# Patient Record
Sex: FEMALE | Race: WHITE
[De-identification: names, ages, dates, MRNs, and addresses within clinical notes are randomized per-mention and may not be internally consistent; named-entity substitution may affect disease eponyms.]

---

## 2019-12-20 NOTE — EDM.PDOC
ED HPI GENERAL MEDICAL PROBLEM





- General


Chief Complaint: Respiratory Problem


Stated Complaint: COUGH LOSS OF VOICE


Time Seen by Provider: 12/20/19 00:51


Source of Information: Reports: Patient, Family ()


History Limitations: Reports: No Limitations





- History of Present Illness


INITIAL COMMENTS - FREE TEXT/NARRATIVE: 





Mrs. Rodriguez is a very pleasant 29-year-old woman with a past medical history 

significant for asthma, irritable bowel syndrome, endometriosis, prediabetes, 

and untreated depression, PTSD, and obstructive sleep apnea, who states that 

she has been sick on and off for the past month. She reports a cough 

occasionally productive of green sputum, and slight wheezing. She reports 

laryngitis, and, indeed, she has a hoarse, thin voice at this time. She states 

that her back and stomach have been hurting. These are no worse if she is 

supine. She states that she has had some nausea, but no vomiting. She denies 

having a sore throat. No recent fever, constipation, diarrhea, or urinary 

symptoms.





The patient has been taking Mucinex, Sudafed, and Delsym, all without any 

relief of symptoms.





The patient states that she saw Dr. Khushboo Velásquez on 11/15/2019, but that 

no tests were done. She was diagnosed with bronchitis, and prescribed 

prednisone.





She states that she then saw her PCP on 12/10/2019. She states that again no 

tests were done, but that she was prescribed a Z-Ramo and Flovent. She states 

that the Flovent was to treat her asthma, but the patient does not know why she 

was prescribed a Z-Ramo.





She states that she then saw Marlene Trevino NP, on 12/14/2019. She states that 

again no tests were done, but that she was prescribed prednisone and amoxicillin

, again to treat asthma and something else.








The patient's PCP is Angy Szymanski NP.


The patient did not receive an influenza vaccine this season, but agreed to 

receive one here.











- Related Data


 Allergies











Allergy/AdvReac Type Severity Reaction Status Date / Time


 


grass pollen Allergy  Cannot Verified 12/20/19 00:05





   Remember  


 


cow milk Allergy  Hives Uncoded 11/21/19 07:58


 


essential oils Allergy  Anaphylactic Uncoded 11/21/19 07:58





   Shock  


 


tylenol with codeine Allergy  Vomiting Uncoded 11/21/19 07:58











Home Meds: 


 Home Meds





Albuterol [Proventil Neb Soln] 2.5 mg NEB ASDIRECTED 04/21/19 [History]


Epi-Pen. 1 unit IM ONETIME PRN 04/21/19 [History]


Montelukast [Singulair] 10 mg PO DAILY 11/21/19 [History]


Fluticasone Propionate [Flovent Hfa] 1 puff INH BID 12/20/19 [History]











Past Medical History


HEENT History: Reports: Impaired Vision


Respiratory History: Reports: Asthma (PFT-confirmed), Sleep Apnea (untreated)


Gastrointestinal History: Reports: GERD, Irritable Bowel Syndrome


OB/GYN History: Reports: Endometriosis (exploratory laparoscopy-confirmed)


Psychiatric History: Reports: Depression (untreated), PTSD (untreated)


Endocrine/Metabolic History: Reports: Obesity/BMI 30+, Other (See Below) (

Prediabetes)





- Past Surgical History


HEENT Surgical History: Reports: Adenoidectomy, Oral Surgery (wisdom teeth 

extraction), Tonsillectomy


GI Surgical History: Reports: Colonoscopy (x 3), EGD (x 2)


Female  Surgical History: Reports: Other (See Below) (Exploratory laparoscopy 

for endometriosis x 2)


Musculoskeletal Surgical History: Reports: Other (See Below) (Right patellar 

realignment)





Social & Family History





- Family History


Family Medical History: Noncontributory





- Tobacco Use


Smoking Status *Q: Former Smoker


Years of Tobacco use: 11


Packs/Tins Daily: 0.1


Month/Year Tobacco Last Used: Quit June 2019





- Caffeine Use


Caffeine Use: Reports: Coffee, Energy Drinks, Soda, Tea





- Alcohol Use


Alcohol Use History: Yes


Alcohol Use Frequency: Socially





- Recreational Drug Use


Recreational Drug Use: No





- Living Situation & Occupation


Living situation: Reports: , with Spouse


Occupation: Unemployed





ED ROS GENERAL





- Review of Systems


Review Of Systems: Comprehensive ROS is negative, except as noted in HPI.





ED EXAM, GENERAL





- Physical Exam


Exam: See Below


Exam Limited By: No Limitations


General Appearance: Alert, WD/WN, No Apparent Distress


Eye Exam: Bilateral Eye: EOMI, Normal Inspection


Ears: Normal External Exam, Normal Canal, Hearing Grossly Normal, Normal TMs


Nose: Normal Inspection, No Blood, Other (Bilateral nasal mucosa edema)


Throat/Mouth: Normal Inspection, Normal Lips, Normal Teeth, Normal Gums, Normal 

Oropharynx, No Airway Compromise, Other (Hoarse voice)


Head: Atraumatic, Normocephalic


Neck: Normal Inspection, Supple, Non-Tender, Full Range of Motion.  No: 

Lymphadenopathy (L), Lymphadenopathy (R)


Respiratory/Chest: No Respiratory Distress, No Accessory Muscle Use, Chest Non-

Tender, Crackles (slight, right lungfield only).  No: Decreased Breath Sounds, 

Rhonchi, Wheezing, Stridor, Prolonged Expiration


Cardiovascular: Normal Peripheral Pulses, Regular Rate, Rhythm, No Edema, No 

Gallop, No JVD, No Murmur, No Rub


Peripheral Pulses: 4+: Radial (L), Radial (R)


GI/Abdominal: Normal Bowel Sounds, Soft, Non-Tender, No Organomegaly, No 

Distention, No Abnormal Bruit, No Mass


 (Female) Exam: Deferred


Rectal (Female) Exam: Deferred


Back Exam: Normal Inspection, Full Range of Motion, NT


Extremities: Normal Inspection, Normal Range of Motion, No Pedal Edema, Normal 

Capillary Refill


Neurological: Alert, Oriented, Normal Cognition, No Motor/Sensory Deficits


Psychiatric: Normal Affect


Skin Exam: Warm, Dry, Intact, Normal Color, No Rash





Course





- Vital Signs


Last Recorded V/S: 


 Last Vital Signs











Temp  36.2 C   12/20/19 00:01


 


Pulse  77   12/20/19 00:01


 


Resp  20   12/20/19 00:01


 


BP  115/75   12/20/19 00:01


 


Pulse Ox  97   12/20/19 00:01














- Orders/Labs/Meds


Meds: 


Medications














Discontinued Medications














Generic Name Dose Route Start Last Admin





  Trade Name Freq  PRN Reason Stop Dose Admin


 


Influenza Virus Vaccine  60 mcg  12/20/19 02:30  12/20/19 03:16





  Fluzone Quad [id number] Syringe  IM  12/20/19 02:31  60 mcg





  .ONCE ONE   Administration





     





     





     





     














- Re-Assessments/Exams


Free Text/Narrative Re-Assessment/Exam: 





12/20/19 02:05


Because I thought I heard some crackles on the right lung field, I ordered a 

chest x-ray, but because the patient has not had a fever, and her oxygen 

saturation is normal, I did not order any blood work.





2-view chest radiograph appears to be grossly normal. The cardiac silhouette is 

within normal limits. No pulmonary vascular congestion. No pleural effusions. 

No focal infiltrate. No pneumothorax. Formal read per the Radiologist pending.





Chest x-ray results discussed with the patient and her . The patient 

appears to have a viral URI with postnasal drip causing her to have a cough. 

She is not suffering from an asthma exacerbation, and she does not have 

bronchitis or pneumonia. I recommended that she stop taking the over-the-

counter cough and cold remedies that she has been taking, all of which are of 

no use. I had the respiratory therapist provide the patient a peak flow meter 

and a space chamber, with instructions to the patient on how to use them.





The patient will receive an influenza vaccine prior to discharge.





Departure





- Departure


Time of Disposition: 02:13


Disposition: Home, Self-Care 01


Condition: Good


Clinical Impression: 


 Viral URI with cough








- Discharge Information


*PRESCRIPTION DRUG MONITORING PROGRAM REVIEWED*: Not Applicable


*COPY OF PRESCRIPTION DRUG MONITORING REPORT IN PATIENT [facility]: Not Applicable


Instructions:  Upper Respiratory Infection, Adult, Easy-to-Read


Referrals: 


Angy Szymanski MD [Primary Care Provider] - 


Forms:  ED Department Discharge


Additional Instructions: 


You were seen in the emergency room for 1 month of a cough with laryngitis.





Workup in the ER included a chest x-ray, which returned normal. You do not have 

pneumonia or bronchitis.





Based on your history, physical exam, and chest x-ray results, your cough and 

laryngitis are most likely due to a viral URI, also known as a common cold. You 

are not suffering from an asthma exacerbation.





As discussed, we recommend that you discontinue all of the over-the-counter 

cough and cold medicines, as well as the Flovent.





You have been given a peak flow meter and a space chamber.





If you are suffering from shortness of breath and wheezing, with or without a 

cough, check your peak flow. If it is in the green zone, do not use albuterol, 

however, if it is in the yellow or red zone, use albuterol as much as necessary 

to get relief. If you require albuterol more often than every 4 hours, you need 

to be seen by a doctor.





Make sure that you use your space chamber whenever you use your albuterol MDI, 

and be sure to shake your MDI for at least 10 seconds prior to activating it.





If any other problems, please do not hesitate to return to the ER.








*You received an influenza vaccine during your ER visit.*





Sepsis Event Note





- Evaluation


Sepsis Screening Result: No Definite Risk





- Focused Exam


Date Exam was Performed: 12/22/19


Time Exam was Performed: 23:21

## 2019-12-20 NOTE — CR
Chest:  Two views of the chest were obtained.

 

Comparison: Prior chest x-ray of 04/21/19.

 

Heart size and mediastinum are normal.  Lungs are clear with no acute 

parenchymal change.  Bony structures are unremarkable.

 

Impression:

1.  Nothing acute is seen on two-view chest x-ray.

 

Diagnostic code #1

 

This report was dictated in [place] Standard Time

## 2020-01-03 NOTE — EDM.PDOC
ED HPI GENERAL MEDICAL PROBLEM





- General


Chief Complaint: Allergic Reaction


Stated Complaint: POSS ALLERGIC REACTION


Time Seen by Provider: 01/03/20 09:37


Source of Information: Reports: Patient


History Limitations: Reports: No Limitations





- History of Present Illness


INITIAL COMMENTS - FREE TEXT/NARRATIVE: 


29-year-old female presents to the ED with generalized pruritus and throat 

itching and feeling like it's closing with some difficulty breathing as well 

since inhaling aromatic essential oils last night. She developed a bit of a red 

rash on her upper anterior chest but otherwise feels generally pruritic. She 

has no asthmatic and did use her inhaler once last night. Start Benadryl 50 mg 

before bed last night and 25 mg at 0600 hrs. this morning without much relief. 

The nation reveals good air entry to both lung fields without any wheezing. She 

is in no distress.





Onset: Sudden


Onset Date: 01/02/20


Onset Time: 21:00


Duration: Hour(s): (Used essential oils last night.), Constant, Other (Not 

getting better.)


Location: Reports: Neck, Chest ( Eyes pruritus him difficulty breathing and 

wheezing. ), Generalized (Does like throat is closing.)


Quality: Reports: Other


Severity: Moderate (Mostly generalized itching with a sense of itching in her 

throat and throat closure.)


Improves with: Reports: None


Worsens with: Reports: None


Context: Reports: Other (Inhalation of essential oils last night. She did not 

apply any to her skin.).  Denies: Activity, Exercise, Lifting, Sick Contact, 

Trauma


Associated Symptoms: Reports: Malaise, Other.  Denies: Confusion, Chest Pain, 

Cough, cough w sputum, Diaphoresis, Fever/Chills, Headaches, Loss of Appetite, 

Nausea/Vomiting, Rash, Seizure, Shortness of Breath, Syncope


Treatments PTA: Reports: Other (see below) (Generalized pruritus with a sense 

of throat closure. 2 mg last night about 2200 hrs. and 25 mg at 0600 hrs. this 

morning.)


  ** Throat


Pain Score (Numeric/FACES): 6





- Related Data


 Allergies











Allergy/AdvReac Type Severity Reaction Status Date / Time


 


grass pollen Allergy  Cannot Verified 01/03/20 09:05





   Remember  


 


cow milk Allergy  Hives Uncoded 01/03/20 09:05


 


essential oils Allergy  Anaphylactic Uncoded 01/03/20 09:05





   Shock  


 


tylenol with codeine Allergy  Vomiting Uncoded 01/03/20 09:05











Home Meds: 


 Home Meds





Albuterol [Proventil Neb Soln] 2.5 mg NEB ASDIRECTED 04/21/19 [History]


Epi-Pen. 1 unit IM ONETIME PRN 04/21/19 [History]


Montelukast [Singulair] 10 mg PO DAILY 11/21/19 [History]


predniSONE [Prednisone] 20 mg PO BID #6 tablet 01/03/20 [Rx]











Past Medical History


HEENT History: Reports: Impaired Vision


Cardiovascular History: Reports: Other (See Below)


Other Cardiovascular History: R AV heart block


Respiratory History: Reports: Asthma, Sleep Apnea


Gastrointestinal History: Reports: GERD, Irritable Bowel Syndrome


OB/GYN History: Reports: Endometriosis


Neurological History: Reports: Headaches, Chronic, Migraines


Psychiatric History: Reports: ADHD, Anxiety, Depression, PTSD


Endocrine/Metabolic History: Reports: Hypothyroidism, Obesity/BMI 30+, Other (

See Below)


Other Endocrine/Metabolic History: insulin resistent





- Infectious Disease History


Infectious Disease History: Reports: Chicken Pox





- Past Surgical History


HEENT Surgical History: Reports: Adenoidectomy, Oral Surgery, Tonsillectomy


GI Surgical History: Reports: Colonoscopy, EGD


Female  Surgical History: Reports: Other (See Below)


Musculoskeletal Surgical History: Reports: Other (See Below)


Other Musculoskeletal Surgeries/Procedures:: R knee sx





Social & Family History





- Family History


Family Medical History: Noncontributory





- Tobacco Use


Smoking Status *Q: Former Smoker


Used Tobacco, but Quit: Yes


Month/Year Tobacco Last Used: 06/2019





- Caffeine Use


Caffeine Use: Reports: Coffee, Energy Drinks, Soda, Tea





- Recreational Drug Use


Recreational Drug Use: No





- Living Situation & Occupation


Living situation: Reports: , with Spouse


Occupation: Unemployed





ED ROS ALLERGIC REACTION





- Review of Systems


Review Of Systems: See Below


Constitutional: Reports: Malaise, Weakness, Fatigue, Decreased Appetite.  Denies

: Fever, Chills, Weight Loss (Lesion taking Benadryl)


HEENT: Reports: Throat Swelling (Does like her throat is slightly swollen. 

Moist is a little hoarse this morning as well.)


Respiratory: Reports: Shortness of Breath, Wheezing, Cough.  Denies: Pleuritic 

Chest Pain (Improved after taking albuterol neb at home.), Sputum, Hemoptysis


Cardiovascular: Reports: No Symptoms


Endocrine: Reports: Fatigue


GI/Abdominal: Reports: No Symptoms


: Reports: No Symptoms


Musculoskeletal: Reports: No Symptoms


Skin: Reports: Other (Little patch of red rash upper anterior chest just at the 

sternal clavicular joint midline)


Neurological: Reports: Dizziness


Psychiatric: Reports: No Symptoms (Generalized pruritus)


Hematologic/Lymphatic: Reports: No Symptoms


Immunologic: Reports: No Symptoms





ED EXAM GENERAL NO PERIP PULSE





- Physical Exam


Exam: See Below


Exam Limited By: No Limitations


General Appearance: Alert, WD/WN, No Apparent Distress, Other (Vital signs 

temperature 36.3 with pulse of 61 and sinus respiratory disease 18 /83 O2 

sats 94% on room air.)


Eye Exam: Bilateral Eye: Normal Inspection (No inflammation of the conjunctiva)


Ears: Normal TMs


Nose: Normal Inspection


Throat/Mouth: Normal Inspection, Normal Lips, Normal Teeth, Normal Oropharynx, 

Other


Head: Atraumatic, Normocephalic (Uvula is slightly erythematous I believe from 

coughing.)


Neck: Normal Inspection, Supple, Non-Tender, Full Range of Motion.  No: 

Lymphadenopathy (L), Lymphadenopathy (R)


Respiratory/Chest: No Respiratory Distress, Lungs Clear, Normal Breath Sounds, 

No Accessory Muscle Use, Chest Non-Tender.  No: Wheezing, Stridor


Cardiovascular: Normal Peripheral Pulses, Regular Rate, Rhythm, No Edema, No 

Gallop, No Murmur, No Rub


GI/Abdominal: Normal Bowel Sounds, Soft, Non-Tender, No Organomegaly


Extremities: Normal Inspection, Normal Range of Motion, Non-Tender, No Pedal 

Edema


Neurological: Alert, Oriented, CN II-XII Intact, Normal Cognition, Normal Gait


Psychiatric: Normal Affect, Normal Mood


Skin Exam: Warm, Dry, Intact, Normal Color, No Rash (Light erythematous rash 

upper anterior chest.), Other (No hives)





Course





- Vital Signs


Last Recorded V/S: 


 Last Vital Signs











Temp  36.3 C   01/03/20 09:01


 


Pulse  61   01/03/20 09:01


 


Resp  18   01/03/20 09:01


 


BP  109/83   01/03/20 09:01


 


Pulse Ox  94 L  01/03/20 09:01














- Orders/Labs/Meds


Meds: 


Medications














Discontinued Medications














Generic Name Dose Route Start Last Admin





  Trade Name Freq  PRN Reason Stop Dose Admin


 


Hydroxyzine HCl  25 mg  01/03/20 09:48  01/03/20 10:07





  Atarax  PO  01/03/20 09:49  25 mg





  ONETIME ONE   Administration





     





     





     





     


 


Prednisone  30 mg  01/03/20 09:48  01/03/20 10:07





  Prednisone  PO  01/03/20 09:49  30 mg





  ONETIME ONE   Administration





     





     





     





     














- Radiology Interpretation


Free Text/Narrative:: 


29-year-old female presents to the ED with development of mild allergic 

reaction to inhaling and safety central oils last night. Suggest this at about 

2100 hrs. and by 2200 hrs. appreciated that she was struggling a bit with 

throat swelling itching and generalized pruritus development and some shortness 

of breath. She did use Benadryl 50 mg last night about 2200 hrs. and you did 

use her albuterol inhaler. She was still somewhat symptomatic this morning and 

took Benadryl 25 mg at 0600 hrs. Examination reveals some mild erythematous 

rash anterior upper chest but no urticaria. She has suffered from generalized 

pruritus. There is no stridor. For the mouth and uvula are normal. Plan 

prednisone 30 mg by mouth now and Atarax 25 mg by mouth in the ED. She'll be 

discharged on prednisone 20 mg twice daily for the next 3 days with the next 

dose due at [facility]. Zyrtec 10 mg twice daily until no further 

itching. Note given to excuse her from the workplace today.








Departure





- Departure


Time of Disposition: 09:58


Disposition: Home, Self-Care 01


Condition: Fair


Clinical Impression: 


Allergic reaction


Qualifiers:


 Encounter type: initial encounter Qualified Code(s): T78.40XA - Allergy, 

unspecified, initial encounter








- Discharge Information


*PRESCRIPTION DRUG MONITORING PROGRAM REVIEWED*: Not Applicable


*COPY OF PRESCRIPTION DRUG MONITORING REPORT IN PATIENT [facility]: Not Applicable


Prescriptions: 


predniSONE [Prednisone] 20 mg PO BID #6 tablet


Referrals: 


Angy Szymanski MD [Primary Care Provider] - 


Forms:  ED Department Discharge, ED Return to Work/School Form


Additional Instructions: 


Evaluation the emergency room department in regards to development of allergy 

symptoms particularly to the upper airway and lungs after a using for inhaling 

aromatic oils. He is appear to be a significant irritant to your asthma and you 

developed upper airway inflammation from them as well. Also generalized 

itching. You have taken Benadryl last night and again this morning. You are 

given Atarax 25 mg in the ED but I would prefer you to  some Zyrtec and 

take 10 mg twice daily for the next 3 days to help with the itching. Initial 

dose of steroid prednisone was given in the ED 30 mg. You need to take 20 mg 

twice daily with breakfast and supper for the next 3 days with the next tablet 

being due  at supper tonight. SPECT marked improvement in symptoms over the 

next 4 hours. Note given to excuse from the workplace today.





Sepsis Event Note





- Evaluation


Sepsis Screening Result: No Definite Risk





- Focused Exam


Vital Signs: 


 Vital Signs











  Temp Pulse Resp BP Pulse Ox


 


 01/03/20 09:01  36.3 C  61  18  109/83  94 L











Date Exam was Performed: 01/03/20


Time Exam was Performed: 11:16

## 2020-06-06 NOTE — EDM.PDOC
ED HPI GENERAL MEDICAL PROBLEM





- General


Chief Complaint: Allergic Reaction


Stated Complaint: POSS ALLERGIC REACTION


Time Seen by Provider: 06/06/20 17:09


Source of Information: Reports: Patient


History Limitations: Reports: No Limitations





- History of Present Illness


INITIAL COMMENTS - FREE TEXT/NARRATIVE: 





The patient presents with an allergic reaction.  She used some essential oils 

yesterday.  She has a rash to her arm and back.  She also has some throat 

swelling.  She has no chest pain, abdominal pain, shortness of breath, nausea 

or vomiting.  She did have an anaphylactic reaction with essential oils in the 

past.  This was a shampoo and she did not realize essential oils were in it.


Onset: Gradual


Duration: Hour(s):


Location: Reports: Back, Upper Extremity, Right


Severity: Moderate


Improves with: Reports: None


Worsens with: Reports: None


Associated Symptoms: Reports: No Other Symptoms





- Related Data


 Allergies











Allergy/AdvReac Type Severity Reaction Status Date / Time


 


grass pollen Allergy  Cannot Verified 06/06/20 17:11





   Remember  


 


cow milk Allergy  Hives Uncoded 01/03/20 09:05


 


essential oils Allergy  Anaphylactic Uncoded 01/03/20 09:05





   Shock  


 


tylenol with codeine Allergy  Vomiting Uncoded 01/03/20 09:05











Home Meds: 


 Home Meds





Albuterol [Proventil Neb Soln] 2.5 mg NEB ASDIRECTED 04/21/19 [History]


Epi-Pen. 1 unit IM ONETIME PRN 04/21/19 [History]


Montelukast [Singulair] 10 mg PO DAILY 11/21/19 [History]


predniSONE [Prednisone] 40 mg PO DAILY #10 tablet 06/06/20 [Rx]











Past Medical History


HEENT History: Reports: Impaired Vision


Cardiovascular History: Reports: Other (See Below)


Other Cardiovascular History: R AV heart block


Respiratory History: Reports: Asthma, Sleep Apnea


Gastrointestinal History: Reports: GERD, Irritable Bowel Syndrome


OB/GYN History: Reports: Endometriosis


Neurological History: Reports: Headaches, Chronic, Migraines


Psychiatric History: Reports: ADHD, Anxiety, Depression, PTSD


Endocrine/Metabolic History: Reports: Hypothyroidism, Obesity/BMI 30+, Other (

See Below)


Other Endocrine/Metabolic History: insulin resistent





- Infectious Disease History


Infectious Disease History: Reports: Chicken Pox





- Past Surgical History


HEENT Surgical History: Reports: Adenoidectomy, Oral Surgery, Tonsillectomy


GI Surgical History: Reports: Colonoscopy, EGD


Female  Surgical History: Reports: Other (See Below)


Musculoskeletal Surgical History: Reports: Other (See Below)


Other Musculoskeletal Surgeries/Procedures:: R knee sx





Social & Family History





- Family History


Family Medical History: Noncontributory





- Tobacco Use


Smoking Status *Q: Never Smoker





- Caffeine Use


Caffeine Use: Reports: Coffee, Energy Drinks, Soda, Tea





- Recreational Drug Use


Recreational Drug Use: No





- Living Situation & Occupation


Living situation: Reports: , with Spouse


Occupation: Unemployed





ED ROS ALLERGIC REACTION





- Review of Systems


Review Of Systems: See Below


Constitutional: Reports: No Symptoms


HEENT: Reports: Other (Throat swelling)


Respiratory: Reports: No Symptoms


Cardiovascular: Reports: No Symptoms


Endocrine: Reports: No Symptoms


GI/Abdominal: Reports: No Symptoms


: Reports: No Symptoms


Musculoskeletal: Reports: No Symptoms


Skin: Reports: Rash





ED EXAM GENERAL NO PERIP PULSE





- Physical Exam


Exam: See Below


Exam Limited By: No Limitations


General Appearance: Alert, No Apparent Distress


Ears: Normal External Exam


Nose: Normal Inspection


Throat/Mouth: Normal Inspection


Head: Atraumatic, Normocephalic


Neck: Normal Inspection


Respiratory/Chest: No Respiratory Distress, Lungs Clear, Normal Breath Sounds


Cardiovascular: Regular Rate, Rhythm, No Edema, No Murmur


GI/Abdominal: Soft, Non-Tender, No Organomegaly, No Mass


Back Exam: Normal Inspection


Extremities: Normal Inspection


Skin Exam: Other (The rash has improved.  I only see a few small bumps)





Course





- Vital Signs


Last Recorded V/S: 


 Last Vital Signs











Temp  98.2 F   06/06/20 17:08


 


Pulse  84   06/06/20 17:08


 


Resp  16   06/06/20 17:08


 


BP  118/74   06/06/20 17:08


 


Pulse Ox  98   06/06/20 17:08














- Orders/Labs/Meds


Meds: 


Medications














Discontinued Medications














Generic Name Dose Route Start Last Admin





  Trade Name Freq  PRN Reason Stop Dose Admin


 


Famotidine  20 mg  06/06/20 17:15  06/06/20 17:22





  Pepcid  PO  06/06/20 17:16  20 mg





  ONETIME ONE   Administration





     





     





     





     


 


Prednisone  40 mg  06/06/20 17:15  06/06/20 17:22





  Prednisone  PO  06/06/20 17:16  40 mg





  ONETIME ONE   Administration





     





     





     





     














- Re-Assessments/Exams


Free Text/Narrative Re-Assessment/Exam: 





06/06/20 17:30


I ordered prednisone 40mg PO and pepcid 20mg PO.


06/06/20 18:14


She is not feeling worse.  I feel it is safe to send her home.  I will get her 

on prednisone and she should take pepcid and benadryl.





Departure





- Departure


Time of Disposition: 18:15


Disposition: Home, Self-Care 01


Condition: Good


Clinical Impression: 


Allergic reaction


Qualifiers:


 Encounter type: initial encounter Qualified Code(s): T78.40XA - Allergy, 

unspecified, initial encounter








- Discharge Information


*PRESCRIPTION DRUG MONITORING PROGRAM REVIEWED*: Not Applicable


*COPY OF PRESCRIPTION DRUG MONITORING REPORT IN PATIENT [facility]: Not Applicable


Prescriptions: 


predniSONE [Prednisone] 40 mg PO DAILY #10 tablet


Referrals: 


Angy Szymanski MD [Primary Care Provider] - 1 Week


Forms:  ED Department Discharge


Additional Instructions: 


Take prednisone daily for 5 days.  Take pepcid daily for 5 days.  Take benadryl 

50mg every 6 hours as needed for allergy symptoms.  Please return if you are 

worse.





Sepsis Event Note





- Evaluation


Sepsis Screening Result: No Definite Risk





- Focused Exam


Vital Signs: 


 Vital Signs











  Temp Pulse Resp BP Pulse Ox


 


 06/06/20 17:08  98.2 F  84  16  118/74  98











Date Exam was Performed: 06/06/20


Time Exam was Performed: 18:14

## 2020-12-20 NOTE — EDM.PDOC
ED HPI GENERAL MEDICAL PROBLEM





- General


Chief Complaint: Respiratory Problem


Stated Complaint: COUGHING UP BLOOD


Time Seen by Provider: 12/20/20 22:25


Source of Information: Reports: Patient, RN Notes Reviewed





- History of Present Illness


INITIAL COMMENTS - FREE TEXT/NARRATIVE: 





30 yr old female with onset of cough 2 days ago.  Coughed some streaks of blood 

this afternoon and evening. That is her main concern.  Low grade fever earlier 

today.  Works at [employer] so does get exposed to people.  No chest pain, does not

feel short of breath.  Does not smoke. 


  ** Left Neck


Pain Score (Numeric/FACES): 7





- Related Data


                                    Allergies











Allergy/AdvReac Type Severity Reaction Status Date / Time


 


grass pollen Allergy  Cannot Verified 12/20/20 22:06





   Remember  


 


cow milk Allergy  Hives Uncoded 12/20/20 22:06


 


essential oils Allergy  Anaphylactic Uncoded 12/20/20 22:06





   Shock  


 


tylenol with codeine Allergy  Vomiting Uncoded 12/20/20 22:06











Home Meds: 


                                    Home Meds





Albuterol [Proventil Neb Soln] 2.5 mg NEB ASDIRECTED 04/21/19 [History]


Epi-Pen. 1 unit IM ONETIME PRN 04/21/19 [History]


Montelukast [Singulair] 10 mg PO DAILY 11/21/19 [History]


Desvenlafaxine Succinate [Pristiq] 25 mg PO DAILY 12/20/20 [History]


QUEtiapine [SEROquel] 50 mg PO DAILY 12/20/20 [History]











Past Medical History


HEENT History: Reports: Impaired Vision


Cardiovascular History: Reports: Other (See Below)


Other Cardiovascular History: R AV heart block


Respiratory History: Reports: Asthma, Sleep Apnea


Gastrointestinal History: Reports: GERD, Irritable Bowel Syndrome


OB/GYN History: Reports: Endometriosis


Neurological History: Reports: Headaches, Chronic, Migraines


Psychiatric History: Reports: ADHD, Anxiety, Depression, PTSD


Endocrine/Metabolic History: Reports: Hypothyroidism, Obesity/BMI 30+, Other 

(See Below)


Other Endocrine/Metabolic History: insulin resistent





- Infectious Disease History


Infectious Disease History: Reports: Chicken Pox





- Past Surgical History


HEENT Surgical History: Reports: Adenoidectomy, Oral Surgery, Tonsillectomy


GI Surgical History: Reports: Colonoscopy, EGD


Female  Surgical History: Reports: Other (See Below)


Musculoskeletal Surgical History: Reports: Other (See Below)


Other Musculoskeletal Surgeries/Procedures:: R knee sx





Social & Family History





- Family History


Family Medical History: No Pertinent Family History





- Tobacco Use


Tobacco Use Status *Q: Never Tobacco User


Second Hand Smoke Exposure: No





- Caffeine Use


Caffeine Use: Reports: None





- Recreational Drug Use


Recreational Drug Use: No





- Living Situation & Occupation


Living situation: Reports: , with Spouse


Occupation: Unemployed





ED ROS GENERAL





- Review of Systems


Review Of Systems: See Below


Constitutional: Denies: Fever, Chills, Diaphoresis


HEENT: Denies: Throat Pain


Respiratory: Denies: Shortness of Breath, Pleuritic Chest Pain


Cardiovascular: Denies: Chest Pain


GI/Abdominal: Denies: Abdominal Pain


Musculoskeletal: Reports: Other (mild achiness)


Neurological: Denies: Headache





ED EXAM, GENERAL





- Physical Exam


Exam: See Below


General Appearance: Alert, No Apparent Distress


Head: Atraumatic


Neck: Supple


Respiratory/Chest: No Respiratory Distress, Lungs Clear, Normal Breath Sounds.  

No: Rhonchi, Wheezing


Cardiovascular: Regular Rate, Rhythm


GI/Abdominal: Soft, Non-Tender


Extremities: No: Pedal Edema, Leg Pain


Neurological: Alert, Oriented, No Motor/Sensory Deficits





Course





- Vital Signs


Last Recorded V/S: 


                                Last Vital Signs











Temp  98.3 F   12/20/20 22:03


 


Pulse  74   12/20/20 22:03


 


Resp  16   12/20/20 22:03


 


BP  126/86   12/20/20 22:03


 


Pulse Ox  97   12/20/20 22:03














- Orders/Labs/Meds


Orders: 


                               Active Orders 24 hr











 Category Date Time Status


 


 Chest 1V Frontal [CR] Stat Exams  12/20/20 22:25 Taken


 


 CORONAVIRUS COVID-19 PCR [facility] Stat Lab  12/20/20 23:36 Ordered














- Re-Assessments/Exams


Free Text/Narrative Re-Assessment/Exam: 





12/20/20 23:48


CXR nl, covid screen done to send to the state.  





Departure





- Departure


Time of Disposition: 23:40


Disposition: Home, Self-Care 01


Condition: Fair


Clinical Impression: 


 Viral syndrome, Bronchitis








- Discharge Information


Instructions:  COVID-19, Prevent the Spread of COVID-19 if You Are Sick - [facility]


Referrals: 


Angy Szymanski, NP [Primary Care Provider] - 


Forms:  ED Department Discharge, ED Return to Work/School Form


Additional Instructions: 


Your CXR is clear, no pneumonia.  Your oxygen levels are very good.  You need to

isolate at home until you get results of the covid screen.  We will call you 

when results come, usually in 2 to 3 days.  Vaporizer or steam as needed.  

Return to ED as needed, especially for any severe difficulty breathing.  





Sepsis Event Note (ED)





- Evaluation


Sepsis Screening Result: No Definite Risk





- Focused Exam


Vital Signs: 


                                   Vital Signs











  Temp Pulse Resp BP Pulse Ox


 


 12/20/20 22:03  98.3 F  74  16  126/86  97














- My Orders


Last 24 Hours: 


My Active Orders





12/20/20 22:25


Chest 1V Frontal [CR] Stat 





12/20/20 23:36


CORONAVIRUS COVID-19 PCR [facility] Stat 














- Assessment/Plan


Last 24 Hours: 


My Active Orders





12/20/20 22:25


Chest 1V Frontal [CR] Stat 





12/20/20 23:36


CORONAVIRUS COVID-19 PCR [facility] Stat

## 2020-12-21 NOTE — CR
Chest: Portable view of the chest was obtained.

 

Comparison: Prior chest x-ray performed on 12/20/19.

 

Heart size and mediastinum are normal.  Lungs are clear with no acute 

parenchymal change.  Bony structures are grossly intact.

 

Impression:

1.  Nothing acute is seen on portable chest x-ray.

 

Diagnostic code #1

## 2021-01-13 NOTE — CR
Chest: Portable view of the chest was obtained.

 

Comparison: Prior chest x-ray of 12/20/20.

 

Heart size and mediastinum are normal.  Lungs are clear with no acute 

parenchymal change.  No discrete bony abnormality is appreciated.

 

Impression:

1.  Nothing acute is seen on portable chest x-ray.

 

Diagnostic code #1

## 2021-01-22 NOTE — CR
Chest: Portable view of the chest was obtained.

 

Comparison: Prior chest x-ray of 01/12/21.

 

Heart size and mediastinum are normal.  Lungs are clear with no acute 

parenchymal change.  Osseous structures are within normal limits for 

the patient's age.

 

Impression:

1.  Nothing acute is seen on portable chest x-ray.

 

Diagnostic code #1

## 2021-01-22 NOTE — EDM.PDOC
ED HPI GENERAL MEDICAL PROBLEM





- General


Chief Complaint: Cardiovascular Problem


Stated Complaint: CHEST PAIN


Time Seen by Provider: 01/22/21 12:20


Source of Information: Reports: Patient


History Limitations: Reports: No Limitations





- History of Present Illness


INITIAL COMMENTS - FREE TEXT/NARRATIVE: 





30-year-old female presents to the emergency department with complaints of chest

pain and shortness of breath. Patient states that she was seen in the emergency 

department about 10 days ago with a similar complaint and was diagnosed with 

bronchitis and started on antibiotics. She took 8 days of antibiotics and states

she did not feel any better with this. [facility] and [facility] told her

today that she should come to the emergency department because she could have a 

pulmonary embolus. Patient is not a smoker, she does not take birth control, she

does not have a clotting disorder that she is aware of, she is not sedentary, 

and she states that she was tested for Covid on her last visit to the emergency 

department and that was negative. Patient's O2 saturations are 99 to 100% on 

room air. It is highly unlikely that she has a PE. However patient states that 

for the last 12 days she has had diarrhea, and nausea. She states on her last 

visit to the emergency department she had loss of taste and smell however that 

has returned. She reports that she does have shortness of breath with exertion 

and significant chest discomfort with movement. She states it is significantly 

worse when taking a deep breath and patient did have significant discomfort with

palpation to her chest wall. She states that her appetite has been significantly

less over the course of the last 10 days as well.


  ** Mid-Sternal Chest


Pain Score (Numeric/FACES): 8





- Related Data


                                    Allergies











Allergy/AdvReac Type Severity Reaction Status Date / Time


 


grass pollen Allergy  Cannot Verified 01/22/21 12:12





   Remember  


 


promethazine Allergy  Rash Verified 01/22/21 12:12


 


cow milk Allergy  Hives Uncoded 01/22/21 12:12


 


essential oils Allergy  Anaphylactic Uncoded 01/22/21 12:12





   Shock  


 


tylenol with codeine Allergy  Vomiting Uncoded 01/22/21 12:12











Home Meds: 


                                    Home Meds





Albuterol [Proventil Neb Soln] 2.5 mg NEB ASDIRECTED 04/21/19 [History]


Epi-Pen. 1 unit IM ONETIME PRN 04/21/19 [History]


Desvenlafaxine Succinate [Pristiq] 25 mg PO DAILY 12/20/20 [History]


QUEtiapine [SEROquel] 50 mg PO DAILY 12/20/20 [History]


Doxycycline [Vibra-Tabs] 100 mg PO Q12HR #16 tab 01/13/21 [Rx]











Past Medical History


HEENT History: Reports: Impaired Vision


Cardiovascular History: Reports: Other (See Below)


Other Cardiovascular History: R AV heart block


Respiratory History: Reports: Asthma, Sleep Apnea


Gastrointestinal History: Reports: GERD, Irritable Bowel Syndrome


OB/GYN History: Reports: Endometriosis


Neurological History: Reports: Headaches, Chronic, Migraines


Psychiatric History: Reports: ADHD, Anxiety, Depression, PTSD


Endocrine/Metabolic History: Reports: Hypothyroidism, Obesity/BMI 30+, Other 

(See Below)


Other Endocrine/Metabolic History: insulin resistent





- Infectious Disease History


Infectious Disease History: Reports: Chicken Pox





- Past Surgical History


HEENT Surgical History: Reports: Adenoidectomy, Oral Surgery, Tonsillectomy


GI Surgical History: Reports: Colonoscopy, EGD


Female  Surgical History: Reports: Other (See Below)


Musculoskeletal Surgical History: Reports: Other (See Below)


Other Musculoskeletal Surgeries/Procedures:: R knee sx





Social & Family History





- Family History


Family Medical History: No Pertinent Family History





- Tobacco Use


Tobacco Use Status *Q: Former Tobacco User


Years of Tobacco use: 1


Packs/Tins Daily: 1


Used Tobacco, but Quit: Yes


Month/Year Tobacco Last Used: 10/2020





- Caffeine Use


Caffeine Use: Reports: Coffee, Energy Drinks, Soda, Tea





- Recreational Drug Use


Recreational Drug Use: No





- Living Situation & Occupation


Living situation: Reports: , with Spouse


Occupation: Employed (Currently employed at [employer])





ED [facility] GENERAL





- Review of Systems


Review Of Systems: See Below


Constitutional: Reports: Decreased Appetite.  Denies: Fever, Chills


HEENT: Reports: Glasses


Respiratory: Reports: Shortness of Breath, Pleuritic Chest Pain, Cough, Sputum. 

 Denies: Wheezing


Cardiovascular: Reports: Chest Pain, Dyspnea on Exertion.  Denies: Edema, 

Orthopnea, Palpitations


Endocrine: Reports: No Symptoms


GI/Abdominal: Reports: Diarrhea, Decreased Appetite, Nausea.  Denies: Abdominal 

Pain, Constipation, Vomiting


: Reports: Frequency, Incontinence, Urgency


Musculoskeletal: Reports: Other (generalized body aches)


Skin: Reports: No Symptoms


Neurological: Reports: No Symptoms


Psychiatric: Reports: No Symptoms


Hematologic/Lymphatic: Reports: No Symptoms


Immunologic: Reports: No Symptoms





ED EXAM, GENERAL





- Physical Exam


Exam: See Below


General Appearance: Alert, WD/WN, No Apparent Distress


Eye Exam: Bilateral Eye: PERRL


Ears: Normal External Exam, Hearing Grossly Normal


Nose: Normal Inspection


Throat/Mouth: Normal Inspection, Normal Voice, No Airway Compromise


Head: Atraumatic, Normocephalic


Neck: Normal Inspection, Supple, Non-Tender


Respiratory/Chest: No Respiratory Distress, Normal Breath Sounds, Decreased 

Breath Sounds.  No: Chest Non-Tender (tender with palpation)


Cardiovascular: Normal Peripheral Pulses, Regular Rate, Rhythm, No Edema, No 

Murmur


Peripheral Pulses: 2+: Radial (L), Radial (R)


GI/Abdominal: Normal Bowel Sounds, Soft, Non-Tender, No Distention


 (Female) Exam: Deferred


Rectal (Female) Exam: Deferred


Back Exam: Normal Inspection, Full Range of Motion


Extremities: Normal Inspection, Normal Range of Motion, Non-Tender, No Pedal 

Edema, Normal Capillary Refill


Neurological: Alert, Oriented, Normal Cognition, Normal Reflexes


Psychiatric: Normal Affect, Normal Mood


Skin Exam: Warm, Dry, Intact, Normal Color, No Rash


Lymphatic: No Adenopathy


  ** #1 Interpretation


EKG Date: 01/22/21


Time: 12:04


Rhythm: NSR


Rate (Beats/Min): 82


Axis: Normal


P-Wave: Present


QRS: Normal


ST-T: Normal


QT: Normal


Comparison: NA - No Prior EKG


EKG Interpretation Comments: 





Per Dr. Lomeli interpretation: no acute changes, normal EKG





Course





- Vital Signs


Text/Narrative:: 





I suspect that the patient's chest pain is pleuritic however I have ordered an 

EKG, chest x-ray and lab work. I also suspect that the patient did have a false 

negative test to Covid and that the symptoms described are due to Covid. So I 

have ordered a CBC, CMP, magnesium, CRP, D-dimer, ferritin, LDH. Patient is 

likely dehydrated due to having significant diarrhea over the course of 12 days 

and has had a decreased appetite so has not had much in the way of p.o. intake. 

I have also ordered Toradol for the discomfort and Zofran for the nausea. 

Urinalysis has been ordered as well as patient states she has had some urinary 

incontinence in the last 12 days as well.


Last Recorded V/S: 


                                Last Vital Signs











Temp  97.7 F   01/22/21 12:07


 


Pulse  85   01/22/21 12:07


 


Resp  15   01/22/21 12:07


 


BP  130/84   01/22/21 12:07


 


Pulse Ox  98   01/22/21 12:07














- Orders/Labs/Meds


Orders: 


                               Active Orders 24 hr











 Category Date Time Status


 


 EKG Documentation Completion [RC] STAT Care  01/22/21 12:31 Active


 


 CORONAVIRUS COVID-19 RENAE [MOLEC] Stat Lab  01/22/21 13:52 Received


 


 Sodium Chloride 0.9% [Saline Flush] Med  01/22/21 12:32 Active





 10 ml FLUSH ASDIRECTED PRN   


 


 Saline Lock Insert [OM.PC] Stat Oth  01/22/21 12:32 Ordered








                                Medication Orders





Sodium Chloride (Saline Flush)  10 ml FLUSH ASDIRECTED PRN


   PRN Reason: Keep Vein Open


   Last Admin: 01/22/21 12:58  Dose: 10 ml


   Documented by: CHYNA








Labs: 


                                Laboratory Tests











  01/22/21 01/22/21 01/22/21 Range/Units





  12:31 12:55 12:55 


 


WBC   13.98 H   (3.98-10.04)  K/mm3


 


RBC   4.79   (3.98-5.22)  M/mm3


 


Hgb   14.6   (11.2-15.7)  gm/dl


 


Hct   43.4   (34.1-44.9)  %


 


MCV   90.6   (79.4-94.8)  fl


 


MCH   30.5   (25.6-32.2)  pg


 


MCHC   33.6   (32.2-35.5)  g/dl


 


RDW Std Deviation   40.0   (36.4-46.3)  fL


 


Plt Count   312   (182-369)  K/mm3


 


MPV   9.6   (9.4-12.3)  fl


 


Neutrophils % (Manual)   68 H   (40-60)  %


 


Band Neutrophils %   0   (0-10)  %


 


Lymphocytes % (Manual)   22   (20-40)  %


 


Atypical Lymphs %   0   %


 


Monocytes % (Manual)   8   (2-10)  %


 


Eosinophils % (Manual)   2   (0.7-5.8)  %


 


Basophils % (Manual)   0 L   (0.1-1.2)  


 


Platelet Estimate   Adequate   


 


RBC Morph Comment   Normal   


 


D-Dimer, Quantitative    0.55 H  (0.19-0.50)  mg/L


 


Sodium     (136-145)  mEq/L


 


Potassium     (3.5-5.1)  mEq/L


 


Chloride     ()  mEq/L


 


Carbon Dioxide     (21-32)  mEq/L


 


Anion Gap     (5-15)  


 


BUN     (7-18)  mg/dL


 


Creatinine     (0.55-1.02)  mg/dL


 


Est Cr Clr Drug Dosing     mL/min


 


Estimated GFR (MDRD)     (>60)  mL/min


 


BUN/Creatinine Ratio     (14-18)  


 


Glucose     ()  mg/dL


 


Calcium     (8.5-10.1)  mg/dL


 


Magnesium     (1.8-2.4)  mg/dl


 


Ferritin     (8-252)  ng/ml


 


Total Bilirubin     (0.2-1.0)  mg/dL


 


AST     (15-37)  U/L


 


ALT     (14-59)  U/L


 


Alkaline Phosphatase     ()  U/L


 


Lactate Dehydrogenase     ()  U/L


 


Troponin I     (0.00-0.056)  ng/mL


 


C-Reactive Protein  1.0    (<1.0)  mg/dL


 


Total Protein     (6.4-8.2)  g/dl


 


Albumin     (3.4-5.0)  g/dl


 


Globulin     gm/dL


 


Albumin/Globulin Ratio     (1-2)  


 


Urine Color     (Yellow)  


 


Urine Appearance     (Clear)  


 


Urine pH     (5.0-8.0)  


 


Ur Specific Gravity     (1.005-1.030)  


 


Urine Protein     (Negative)  


 


Urine Glucose (UA)     (Negative)  


 


Urine Ketones     (Negative)  


 


Urine Occult Blood     (Negative)  


 


Urine Nitrite     (Negative)  


 


Urine Bilirubin     (Negative)  


 


Urine Urobilinogen     (0.2-1.0)  


 


Ur Leukocyte Esterase     (Negative)  














  01/22/21 01/22/21 01/22/21 Range/Units





  12:55 12:55 13:11 


 


WBC     (3.98-10.04)  K/mm3


 


RBC     (3.98-5.22)  M/mm3


 


Hgb     (11.2-15.7)  gm/dl


 


Hct     (34.1-44.9)  %


 


MCV     (79.4-94.8)  fl


 


MCH     (25.6-32.2)  pg


 


MCHC     (32.2-35.5)  g/dl


 


RDW Std Deviation     (36.4-46.3)  fL


 


Plt Count     (182-369)  K/mm3


 


MPV     (9.4-12.3)  fl


 


Neutrophils % (Manual)     (40-60)  %


 


Band Neutrophils %     (0-10)  %


 


Lymphocytes % (Manual)     (20-40)  %


 


Atypical Lymphs %     %


 


Monocytes % (Manual)     (2-10)  %


 


Eosinophils % (Manual)     (0.7-5.8)  %


 


Basophils % (Manual)     (0.1-1.2)  


 


Platelet Estimate     


 


RBC Morph Comment     


 


D-Dimer, Quantitative     (0.19-0.50)  mg/L


 


Sodium  141    (136-145)  mEq/L


 


Potassium  3.8    (3.5-5.1)  mEq/L


 


Chloride  104    ()  mEq/L


 


Carbon Dioxide  27    (21-32)  mEq/L


 


Anion Gap  13.8    (5-15)  


 


BUN  8    (7-18)  mg/dL


 


Creatinine  0.8    (0.55-1.02)  mg/dL


 


Est Cr Clr Drug Dosing  77.59    mL/min


 


Estimated GFR (MDRD)  > 60    (>60)  mL/min


 


BUN/Creatinine Ratio  10.0 L    (14-18)  


 


Glucose  82    ()  mg/dL


 


Calcium  9.3    (8.5-10.1)  mg/dL


 


Magnesium  2.1    (1.8-2.4)  mg/dl


 


Ferritin   119   (8-252)  ng/ml


 


Total Bilirubin  0.3    (0.2-1.0)  mg/dL


 


AST  13 L    (15-37)  U/L


 


ALT  25    (14-59)  U/L


 


Alkaline Phosphatase  83    ()  U/L


 


Lactate Dehydrogenase  177    ()  U/L


 


Troponin I  < 0.017    (0.00-0.056)  ng/mL


 


C-Reactive Protein     (<1.0)  mg/dL


 


Total Protein  7.9    (6.4-8.2)  g/dl


 


Albumin  3.6    (3.4-5.0)  g/dl


 


Globulin  4.3    gm/dL


 


Albumin/Globulin Ratio  0.8 L    (1-2)  


 


Urine Color    Light yellow  (Yellow)  


 


Urine Appearance    Clear  (Clear)  


 


Urine pH    7.5  (5.0-8.0)  


 


Ur Specific Gravity    1.025  (1.005-1.030)  


 


Urine Protein    Negative  (Negative)  


 


Urine Glucose (UA)    Negative  (Negative)  


 


Urine Ketones    Negative  (Negative)  


 


Urine Occult Blood    Negative  (Negative)  


 


Urine Nitrite    Negative  (Negative)  


 


Urine Bilirubin    Negative  (Negative)  


 


Urine Urobilinogen    0.2  (0.2-1.0)  


 


Ur Leukocyte Esterase    Negative  (Negative)  











Meds: 


Medications











Generic Name Dose Route Start Last Admin





  Trade Name Eliu  PRN Reason Stop Dose Admin


 


Sodium Chloride  10 ml  01/22/21 12:32  01/22/21 12:58





  Saline Flush  FLUSH   10 ml





  ASDIRECTED PRN   Administration





  Keep Vein Open  














Discontinued Medications














Generic Name Dose Route Start Last Admin





  Trade Name Eliu  PRN Reason Stop Dose Admin


 


Sodium Chloride  1,000 mls @ 999 mls/hr  01/22/21 12:32  01/22/21 12:58





  Normal Saline  IV  01/22/21 13:32  999 mls/hr





  ONETIME ONE   Administration


 


Ketorolac Tromethamine  30 mg  01/22/21 12:32  01/22/21 12:58





  Toradol  IVPUSH  01/22/21 12:33  30 mg





  ONETIME ONE   Administration


 


Ondansetron HCl  4 mg  01/22/21 12:32  01/22/21 12:58





  Zofran  IVPUSH  01/22/21 12:33  4 mg





  ONETIME ONE   Administration














- Re-Assessments/Exams


Free Text/Narrative Re-Assessment/Exam: 





01/22/21 13:55


Portable chest x-ray radiology impression: Nothing acute is appreciated on 

portable chest x-ray.


01/22/21 14:17


Urinalysis is completely unremarkable.


01/22/21 14:19


Labs reveal a WBC of 13.98, neutrophil percentage 68, D-dimer 0.55, sodium 141, 

potassium 3.8, anion gap 13.8, BUN 8, creatinine 0.8, GFR greater than 60, 

magnesium 2.1, ferritin 119, , troponin less than 0.017, C-reactive 

protein 1.0,





I feel the white count is elevated due to pleuritic type pain and chest wall 

pain.  D-dimer is only slightly elevated above normal and I really do not feel 

like the patient has a PE as her O2 saturations are 98 to 100% on room air.  

Patient also does have very significant discomfort when taking in a deep breath 

and palpating her chest wall.  I discussed this case with Dr. Lomeli who does 

agree that a CT of the chest is not warranted as the risks significantly 

outweigh the benefits.  Patient also does not have any lower leg discomfort to 

suggest the possibility of a blood clot or PE..  Patient will be discharged to 

home, recommend that she takes ibuprofen 600 mg every 6 hours for the next 48 

hours.  Patient states that she is allergic to Aleve as it causes her to have 

inflammation in her abdomen.











Departure





- Departure


Time of Disposition: 14:33


Disposition: Home, Self-Care 01


Condition: Good


Clinical Impression: 


 Atypical chest pain





Instructions:  Shortness of Breath, Adult, Easy-to-Read, Chest Wall Pain, 

Easy-to-Read


Referrals: 


Angy Szymanski NP [Primary Care Provider] - 


Forms:  ED Department Discharge


Additional Instructions: 


You were seen in the emergency department with complaints of chest pain and 

shortness of breath.  Chest x-ray and electrocardiogram were unremarkable.  Lab 

work revealed that you did have a very slightly elevated white blood cell count 

but I feel that this is due to your chest wall discomfort.  On assessment you 

are found to be very tender to touch all along your chest wall and you did 

complain of pain with deep breathing.  You were also checked for Covid and this 

was not resulted out at the time that you left however the lab work that 

accompanied your work-up do not show any significant findings for Covid.  Your 

urinalysis was completely unremarkable.  You do not have an infection in your 

bladder.  Recommend that you take ibuprofen 600 mg every 6 hours for the next 48

hours and use ice or heat for comfort to your chest.  If you are not better by 

Monday please follow-up with your primary care physician in the clinic.  Should 

your condition worsen or change please return to the emergency department





Sepsis Event Note (ED)





- Evaluation


Sepsis Screening Result: No Definite Risk





- Focused Exam


Vital Signs: 


                                   Vital Signs











  Temp Pulse Resp BP Pulse Ox


 


 01/22/21 12:07  97.7 F  85  15  130/84  98














- My Orders


Last 24 Hours: 


My Active Orders





01/22/21 12:31


EKG Documentation Completion [RC] STAT 





01/22/21 12:32


Sodium Chloride 0.9% [Saline Flush]   10 ml FLUSH ASDIRECTED PRN 


Saline Lock Insert [OM.PC] Stat 





01/22/21 13:52


CORONAVIRUS COVID-19 RENAE [MOLEC] Stat 














- Assessment/Plan


Last 24 Hours: 


My Active Orders





01/22/21 12:31


EKG Documentation Completion [RC] STAT 





01/22/21 12:32


Sodium Chloride 0.9% [Saline Flush]   10 ml FLUSH ASDIRECTED PRN 


Saline Lock Insert [OM.PC] Stat 





01/22/21 13:52


CORONAVIRUS COVID-19 RENAE [MOLEC] Stat

## 2021-06-17 NOTE — EDM.PDOC
ED HPI GENERAL MEDICAL PROBLEM





- General


Chief Complaint: Eye Problems


Stated Complaint: EYE PROBLEM


Time Seen by Provider: 06/17/21 20:10


Source of Information: Reports: Patient, Significant Other (Boyfriend)


History Limitations: Reports: No Limitations





- History of Present Illness


INITIAL COMMENTS - FREE TEXT/NARRATIVE: 





Ms. Rodriguez is a pleasant 31-year-old woman who now presents to the ED after 

developing a foreign body sensation in her left eye around 19:45 this evening.  

She states that she was simply walking at the time.  She washed her left eye 

with her boyfriend's Visine, but the foreign body sensation persists.  She had a

visual changes or photophobia.  No prior left eye injury.





Here in the ED, the patient is found to be hemodynamically stable, afebrile, 

saturating 95% on room air.  She appears to be relatively comfortable, seated on

the gurney.





Prior to this evening, the patient denies having a recent fever, chills, sore 

throat, ear pain, nasal or sinus congestion, cough, dyspnea, chest pain, 

palpitations, nausea, vomiting, constipation, diarrhea, abdominal pain, urinary 

symptoms, recent weight gain or weight loss, recent bloody bowel movements or b

lack bowel movements, recent joint aches, headaches, or rashes.








The patient's PCP is Angy Szymanski NP.








  ** Left Eye


Pain Score (Numeric/FACES): 6





- Related Data


                                    Allergies











Allergy/AdvReac Type Severity Reaction Status Date / Time


 


grass pollen Allergy Severe Cannot Verified 06/17/21 20:08





   Remember  


 


latex Allergy Severe Hives Verified 06/17/21 20:08


 


promethazine Allergy Severe Rash Verified 06/17/21 20:08


 


cow milk Allergy Severe Hives Uncoded 06/17/21 20:08


 


essential oils Allergy Severe Anaphylactic Uncoded 06/17/21 20:08





   Shock  


 


tylenol with codeine AdvReac Severe Vomiting Uncoded 06/17/21 20:08











Home Meds: 


                                    Home Meds





Albuterol [Proventil Neb Soln] 2.5 mg NEB ASDIRECTED 04/21/19 [History]


Epi-Pen. 1 unit IM ONETIME PRN 04/21/19 [History]











Past Medical History


HEENT History: Reports: Impaired Vision


Respiratory History: Reports: Asthma (PFT-proven), Sleep Apnea (untreated)


Gastrointestinal History: Reports: GERD, Irritable Bowel Syndrome


OB/GYN History: Reports: Endometriosis (laparoscopy-proven)


Psychiatric History: Reports: Depression (untreated), PTSD (untreated)


Endocrine/Metabolic History: Reports: Obesity/BMI 30+, Other (See Below) 

(Prediabetes)





- Infectious Disease History


Infectious Disease History: Reports: Chicken Pox





- Past Surgical History


HEENT Surgical History: Reports: Adenoidectomy, Oral Surgery (dental 

extractons), Tonsillectomy


GI Surgical History: Reports: Colonoscopy (x 3), EGD (x 2)


Female  Surgical History: Reports: Other (See Below) (Exploratory laparoscopy 

for endometriosis x 2)


Musculoskeletal Surgical History: Reports: Other (See Below) (Right patella 

realignment)





Social & Family History





- Tobacco Use


Years of Tobacco use: 11


Packs/Tins Daily: 0.1


Packs/Tins Daily Comment: Quit 2019





- Caffeine Use


Caffeine Use: Reports: Coffee, Energy Drinks, Soda, Tea





- Alcohol Use


Alcohol Use History: Yes


Alcohol Use Frequency: Socially





- Recreational Drug Use


Recreational Drug Use: Yes


Drug Use in Last 12 Months: Yes


Recreational Drug Type: Reports: Marijuana/Hashish





- Living Situation & Occupation


Living situation: Reports:  (), Other (with friends)


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





ED ROS GENERAL





- Review of Systems


Review Of Systems: Comprehensive ROS is negative, except as noted in HPI.





ED EXAM GENERAL W FULL EYE





- Physical Exam


Exam: See Below


Exam Limited By: No Limitations


General Appearance: Alert, WD/WN, No Apparent Distress


Eyelids: Left: Lid Everted for Exam, Bilateral: Normal Appearance


Conjunctiva & Sclera: Right: Normal Appearance, Left: Injected


Cornea Exam: Left: Examined with Flourescein, Bilateral: Normal Appearance


Extraocular Movements: Bilateral: Intact


Pupils: Normal Accommodation


Pupillary Size: Bilateral: 5 mm


Pupillary Reaction: Bilateral: Brisk


Anterior Chamber: Bilateral: Normal Appearance





Course





- Vital Signs


Last Recorded V/S: 


                                Last Vital Signs











Temp  36.7 C   06/17/21 20:06


 


Pulse  84   06/17/21 20:06


 


Resp  16   06/17/21 20:06


 


BP  132/86   06/17/21 20:06


 


Pulse Ox  95   06/17/21 20:06














- Orders/Labs/Meds


Meds: 


Medications














Discontinued Medications














Generic Name Dose Route Start Last Admin





  Trade Name Freq  PRN Reason Stop Dose Admin


 


Erythromycin  1 gm  06/17/21 20:40  06/17/21 20:47





  Erythromycin Base 0.5% Ophth Oint 1 Gm Tube  EYELF  06/17/21 20:41  1 applic





  ONETIME STA   Administration


 


Fluorescein Sodium  1 mg  06/17/21 20:20  06/17/21 20:32





  Fluorescein 1 Mg Ophth Strip  EYELF  06/17/21 20:21  1 mg





  ONETIME ONE   Administration


 


Proparacaine HCl  1 ml  06/17/21 20:19  06/17/21 20:32





  Proparacaine 0.5% Ophth Soln 15 Ml Bottle  EYELF  06/17/21 20:20  1 drop





  ONETIME STA   Administration














- Re-Assessments/Exams


Free Text/Narrative Re-Assessment/Exam: 





06/17/21 20:41


As above, the patient developed a foreign body sensation to her left eye around 

19:45 this evening.  She flushed her eye with her boyfriend's Visine, without 

improvement in her symptoms.  On examination, no foreign body or globe injury 

was seen.  After proparacaine and fluorescein were instilled into her left eye, 

no conjunctival injury was seen under Ng lamp.  Under slit-lamp examination, 

again, no abnormality was found.





I suspect that the patient probably did have a foreign body in her left eye, but

 she successfully removed it, leaving no visible trace.  Going forward, I will 

have Allyssa GARCIAS instill some erythromycin ointment into her left eye, show her how

 to do it, and give her the tube.  The patient can instill this up to six times 

a day to give her some relief.  If she still has eye discomfort after 3 days, 

she needs to follow-up with an eye doctor.











Departure





- Departure


Time of Disposition: 20:43


Disposition: Home, Self-Care 01


Condition: Good


Clinical Impression: 


 Sensation of foreign body in eye








- Discharge Information


*PRESCRIPTION DRUG MONITORING PROGRAM REVIEWED*: Not Applicable


*COPY OF PRESCRIPTION DRUG MONITORING REPORT IN PATIENT [facility]: Not Applicable


Instructions:  Eye Foreign Body, Easy-to-Read


Referrals: 


Angy Szymanski NP [Primary Care Provider] - 


Forms:  ED Department Discharge


Additional Instructions: 


You were seen in the emergency room after developing the sensation of a foreign 

body in your left eye this evening.





On examination with fluorescein under Woods lamp and slit lamp, no foreign body 

or corneal injury was found.





Based on your history and physical examination, it is most likely that you did 

have a foreign body to your left eye, but that you successfully removed it on 

your own.





Your nurse instilled some erythromycin ointment into your left eye and showed 

you how to do it.  You may instill a 1 cm ribbon of erythromycin into your lower

eyelid up to six times a day, as needed for discomfort.





If you still have left eye discomfort after 3 days, we recommend that you 

follow-up with an eye doctor.





If any other problems, please do not hesitate to return to the ER.





Sepsis Event Note (ED)





- Evaluation


Sepsis Screening Result: No Definite Risk





- Focused Exam


Vital Signs: 


                                   Vital Signs











  Temp Pulse Resp BP Pulse Ox


 


 06/17/21 20:06  36.7 C  84  16  132/86  95

## 2021-09-06 NOTE — EDM.PDOC
ED HPI GENERAL MEDICAL PROBLEM





- General


Chief Complaint: OB/GYN Problem


Stated Complaint: 8 WKS PREGNANT/CRAMPING/SPOTTING


Time Seen by Provider: 21 02:53


Source of Information: Reports: Patient, Significant Other (Boyfriend)


History Limitations: Reports: No Limitations





- History of Present Illness


INITIAL COMMENTS - FREE TEXT/NARRATIVE: 





Ms. Rodriguez is a pleasant 31-year-old woman who states that her LMP was 

2021 = 8w 3d gestation, AYANA 4/15/2022, G1, P0, who states that she developed

vaginal spotting and cramping around 22:00 to 23:00 last night, , .  She did not take any over-the-counter or home remedies prior to coming to 

the ED.





Here in the ED, the patient is found to be hemodynamically stable, afebrile, 

saturating 100% on room air.  She appears to be comfortable, in no acute 

distress.





The patient states that she has been having nausea during this pregnancy, 

otherwise, prior to last night, the patient denies having a recent fever, chil

ls, sore throat, ear pain, nasal or sinus congestion, cough, dyspnea, chest 

pain, palpitations, vomiting, constipation, diarrhea, abdominal pain, urinary 

symptoms, recent weight gain or weight loss, recent bloody bowel movements or 

black bowel movements, recent joint aches, headaches, or rashes.








The patient's PCP is Angy Szymanski NP.


Her Obstetrician is going to be Dr. Francine Szymanski, at [facility].  She 

has an appointment to see Dr. Szymanski in late September.


She has not received a COVID vaccination.








  ** Abdomen


Pain Score (Numeric/FACES): 4





- Related Data


                                    Allergies











Allergy/AdvReac Type Severity Reaction Status Date / Time


 


grass pollen Allergy Severe Cannot Verified 21 02:22





   Remember  


 


latex Allergy Severe Hives Verified 21 02:22


 


promethazine Allergy Severe Rash Verified 21 02:22


 


cow milk Allergy Severe Hives Uncoded 21 02:22


 


essential oils Allergy Severe Anaphylactic Uncoded 21 02:22





   Shock  


 


tylenol with codeine AdvReac Severe Vomiting Uncoded 21 02:22











Home Meds: 


                                    Home Meds





Albuterol [Proventil Neb Soln] 2.5 mg NEB ASDIRECTED 19 [History]


Epi-Pen. 1 unit IM ONETIME PRN 19 [History]


Pnv No.95/Ferrous Fum/Folic AC [Prenatal Vitamin Tablet] 1 tab PO DAILY 21

[History]











Past Medical History


HEENT History: Reports: Impaired Vision


Respiratory History: Reports: Asthma (PFT-proven), Sleep Apnea (untreated)


Gastrointestinal History: Reports: GERD, Irritable Bowel Syndrome


OB/GYN History: Reports: Endometriosis (laparoscopy-proven)


: 1


Para: 0


Psychiatric History: Reports: Depression (untreated), PTSD (untreated)


Endocrine/Metabolic History: Reports: Obesity/BMI 30+, Other (See Below) 

(Prediabetes)





- Infectious Disease History


Infectious Disease History: Reports: Chicken Pox





- Past Surgical History


HEENT Surgical History: Reports: Adenoidectomy, Oral Surgery (dental 

extractions), Tonsillectomy


GI Surgical History: Reports: Colonoscopy (x 3), EGD (x 2)


Female  Surgical History: Reports: Other (See Below) (Exploratory laparoscopy 

for endometriosis x 2)


Musculoskeletal Surgical History: Reports: Other (See Below) (Right patella 

realignment)





Social & Family History





- Tobacco Use


Tobacco Use Status *Q: Former Tobacco User


Month/Year Tobacco Last Used: Quit 21





- Caffeine Use


Caffeine Use: Reports: Coffee, Energy Drinks, Soda, Tea





- Recreational Drug Use


Recreational Drug Use: No





- Living Situation & Occupation


Living situation: Reports:  (), with Significant Other 

(Boyfriend + his grandparents)


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





ED ROS GENERAL





- Review of Systems


Review Of Systems: Comprehensive ROS is negative, except as noted in HPI.





ED EXAM PREGNANCY





- Physical Exam


Exam: See Below


Exam Limited By: No Limitations


General Appearance: Alert, WD/WN, No Apparent Distress


Eye Exam: Bilateral Eye: EOMI, Normal Inspection


Ears: Normal External Exam, Hearing Grossly Normal


Nose: Normal Inspection


Throat/Mouth: Normal Inspection, Normal Lips, Normal Voice, No Airway Compromise


Head: Atraumatic, Normocephalic


Neck: Normal Inspection, Full Range of Motion


Respiratory/Chest: No Respiratory Distress, Lungs Clear, Normal Breath Sounds, 

No Accessory Muscle Use


Cardiovascular: Normal Peripheral Pulses, Regular Rate, Rhythm, No Gallop, No 

JVD, No Murmur, No Rub


GI/Abdominal Exam: Normal Bowel Sounds, Soft, No Organomegaly, No Distention, No

 Abnormal Bruit, No Mass, Tender (nonfocal across lower abdomen)


 (Female) Exam: Normal External Exam, Cervical Discharge (light greenish), 

Other (Cervix closed. No visible blood - new or old.  Greenish vaginal 

discharge.  The patient reported vaginal discomfort with placement of the 

speculum.)


Back Exam: Normal Inspection, Full Range of Motion


Extremities: Normal Inspection, Normal Range of Motion, Normal Capillary Refill


Neurological: Alert, Oriented, Normal Cognition, No Motor/Sensory Deficits


Psychiatric: Normal Affect


Skin Exam: Warm, Dry, Intact, Normal Color, No Rash





Course





- Vital Signs


Last Recorded V/S: 


                                Last Vital Signs











Temp  36.9 C   21 02:16


 


Pulse  81   21 02:16


 


Resp  16   21 02:16


 


BP  123/82   21 02:16


 


Pulse Ox  100   21 02:16














- Orders/Labs/Meds


Orders: 


                               Active Orders 24 hr











 Category Date Time Status


 


 OB Transvaginal [US] Stat Exams  21 03:06 Taken


 


 CULTURE URINE [MREF] Stat Lab  21 03:25 Received


 


 PATIENT RETYPE [BBK] Routine Lab  21 04:19 Ordered











Labs: 


                                Laboratory Tests











  21 Range/Units





  03:15 03:15 03:15 


 


WBC  8.71    (3.98-10.04)  K/mm3


 


RBC  4.53    (3.98-5.22)  M/mm3


 


Hgb  13.8    (11.2-15.7)  gm/dl


 


Hct  40.4    (34.1-44.9)  %


 


MCV  89.2    (79.4-94.8)  fl


 


MCH  30.5    (25.6-32.2)  pg


 


MCHC  34.2    (32.2-35.5)  g/dl


 


RDW Std Deviation  38.8    (36.4-46.3)  fL


 


Plt Count  294    (182-369)  K/mm3


 


MPV  9.2 L    (9.4-12.3)  fl


 


Neutrophils % (Manual)  57    (40-60)  %


 


Band Neutrophils %  0    (0-10)  %


 


Lymphocytes % (Manual)  33    (20-40)  %


 


Atypical Lymphs %  0    %


 


Monocytes % (Manual)  9    (2-10)  %


 


Eosinophils % (Manual)  0 L    (0.7-5.8)  %


 


Basophils % (Manual)  1    (0.1-1.2)  


 


Platelet Estimate  Adequate    


 


RBC Morph Comment  Normal    


 


Sodium   138   (136-145)  mEq/L


 


Potassium   3.4 L   (3.5-5.1)  mEq/L


 


Chloride   104   ()  mEq/L


 


Carbon Dioxide   27   (21-32)  mEq/L


 


Anion Gap   10.4   (5-15)  


 


BUN   8   (7-18)  mg/dL


 


Creatinine   0.7   (0.55-1.02)  mg/dL


 


Est Cr Clr Drug Dosing   87.87   mL/min


 


Estimated GFR (MDRD)   > 60   (>60)  mL/min


 


BUN/Creatinine Ratio   11.4 L   (14-18)  


 


Glucose   86   (70-99)  mg/dL


 


Calcium   8.5   (8.5-10.1)  mg/dL


 


Total Bilirubin   0.4   (0.2-1.0)  mg/dL


 


AST   23   (15-37)  U/L


 


ALT   29   (14-59)  U/L


 


Alkaline Phosphatase   62   ()  U/L


 


Total Protein   7.4   (6.4-8.2)  g/dl


 


Albumin   3.4   (3.4-5.0)  g/dl


 


Globulin   4.0   gm/dL


 


Albumin/Globulin Ratio   0.9 L   (1-2)  


 


HCG, Quant   995.0   mIU/mL


 


Urine Color     (Yellow)  


 


Urine Appearance     (Clear)  


 


Urine pH     (5.0-8.0)  


 


Ur Specific Gravity     (1.005-1.030)  


 


Urine Protein     (Negative)  


 


Urine Glucose (UA)     (Negative)  


 


Urine Ketones     (Negative)  


 


Urine Occult Blood     (Negative)  


 


Urine Nitrite     (Negative)  


 


Urine Bilirubin     (Negative)  


 


Urine Urobilinogen     (0.2-1.0)  


 


Ur Leukocyte Esterase     (Negative)  


 


Urine RBC     (0-5)  /hpf


 


Urine WBC     (0-5)  /hpf


 


Ur Squamous Epith Cells     (0-5)  /hpf


 


Urine Bacteria     (FEW)  /hpf


 


Urine Mucus     (FEW)  /hpf


 


Urine Trichomonas     (NOT SEEN)  


 


C trachomatis DNA (PCR)     


 


N gonorrhoeae DNA (PCR)     


 


Blood Type    O POSITIVE  














  21 Range/Units





  03:30 03:33 


 


WBC    (3.98-10.04)  K/mm3


 


RBC    (3.98-5.22)  M/mm3


 


Hgb    (11.2-15.7)  gm/dl


 


Hct    (34.1-44.9)  %


 


MCV    (79.4-94.8)  fl


 


MCH    (25.6-32.2)  pg


 


MCHC    (32.2-35.5)  g/dl


 


RDW Std Deviation    (36.4-46.3)  fL


 


Plt Count    (182-369)  K/mm3


 


MPV    (9.4-12.3)  fl


 


Neutrophils % (Manual)    (40-60)  %


 


Band Neutrophils %    (0-10)  %


 


Lymphocytes % (Manual)    (20-40)  %


 


Atypical Lymphs %    %


 


Monocytes % (Manual)    (2-10)  %


 


Eosinophils % (Manual)    (0.7-5.8)  %


 


Basophils % (Manual)    (0.1-1.2)  


 


Platelet Estimate    


 


RBC Morph Comment    


 


Sodium    (136-145)  mEq/L


 


Potassium    (3.5-5.1)  mEq/L


 


Chloride    ()  mEq/L


 


Carbon Dioxide    (21-32)  mEq/L


 


Anion Gap    (5-15)  


 


BUN    (7-18)  mg/dL


 


Creatinine    (0.55-1.02)  mg/dL


 


Est Cr Clr Drug Dosing    mL/min


 


Estimated GFR (MDRD)    (>60)  mL/min


 


BUN/Creatinine Ratio    (14-18)  


 


Glucose    (70-99)  mg/dL


 


Calcium    (8.5-10.1)  mg/dL


 


Total Bilirubin    (0.2-1.0)  mg/dL


 


AST    (15-37)  U/L


 


ALT    (14-59)  U/L


 


Alkaline Phosphatase    ()  U/L


 


Total Protein    (6.4-8.2)  g/dl


 


Albumin    (3.4-5.0)  g/dl


 


Globulin    gm/dL


 


Albumin/Globulin Ratio    (1-2)  


 


HCG, Quant    mIU/mL


 


Urine Color   Yellow  (Yellow)  


 


Urine Appearance   Clear  (Clear)  


 


Urine pH   7.0  (5.0-8.0)  


 


Ur Specific Gravity   1.015  (1.005-1.030)  


 


Urine Protein   Negative  (Negative)  


 


Urine Glucose (UA)   Negative  (Negative)  


 


Urine Ketones   Negative  (Negative)  


 


Urine Occult Blood   1+ H  (Negative)  


 


Urine Nitrite   Negative  (Negative)  


 


Urine Bilirubin   Negative  (Negative)  


 


Urine Urobilinogen   0.2  (0.2-1.0)  


 


Ur Leukocyte Esterase   2+ H  (Negative)  


 


Urine RBC   0-5  (0-5)  /hpf


 


Urine WBC   5-10 H  (0-5)  /hpf


 


Ur Squamous Epith Cells   0-5  (0-5)  /hpf


 


Urine Bacteria   Moderate H  (FEW)  /hpf


 


Urine Mucus   Not seen  (FEW)  /hpf


 


Urine Trichomonas   Few H  (NOT SEEN)  


 


C trachomatis DNA (PCR)  Not detected   


 


N gonorrhoeae DNA (PCR)  Not detected   


 


Blood Type    











Meds: 


Medications














Discontinued Medications














Generic Name Dose Route Start Last Admin





  Trade Name Freq  PRN Reason Stop Dose Admin


 


Metronidazole  500 mg  21 06:03 





  Metronidazole 500 Mg Tab  PO  21 06:04 





  ONETIME STA  














- Re-Assessments/Exams


Free Text/Narrative Re-Assessment/Exam: 





21 03:07


I have ordered several blood tests, including an ABO/Rh, a urinalysis, and a 

transvaginal ultrasound.  We will keep the patient NPO.








21 03:35


On pelvic examination, the cervix is closed, but there is a greenish vaginal 

discharge, and the patient reported discomfort to placement of the speculum.  No

 visible blood, new appearing or old.  She reported that she has had vaginal 

discomfort and a discharge for about 2 weeks.  I swabbed the vagina and have 

ordered a wet prep, GC/chlamydia by PCR, and a vaginal culture.








21 05:25


The patient's CBC is unremarkable.


Her CMP is remarkable for slight hypokalemia of 3.4, and is otherwise 

unremarkable.


Her quantitative hCG is 995.


Her blood type is O-Pos.


Her urinalysis is remarkable for 1+ occult blood with 0-5 RBCs, 2+ leukocyte 

esterase with 5-10 WBCs, nitrate negative with moderate bacteria, and 0-5 

squamous epithelial cells.


Vaginal GC/chlamydia by PCR is negative for both.


Her vaginal wet prep is remarkable for few trichomonas, few clue cells, moderate

 WBCs, few epithelial cells, rare RBCs, and positive for trichomonas antigen.





Transvaginal ultrasound is read by Wojciech as:


1.  Intrauterine gestational sac-like structures, which would correspond to 

estimated gestational ages of 4 weeks 6 days and 5 weeks 0 days were they to 

represent gestational sacs.  Differential diagnosis includes normal early 

pregnancy, failed pregnancy and nonvisualized ectopic with a pseudo-sac.  

Suggest correlation with serial beta-hCG's and follow-up ultrasound when 

clinically appropriate.


2.  2.3 cm thick-walled left ovarian corpus luteum.








21 06:01


Case discussed with Dr. Crews at 05:56.  He feels that the patient is pregnant, 

and that her LMP is likely off.  He recommended treatment for trichomonas with 

metronidazole 500 mg po BID x 7 days, and for her boyfriend to be treated with 

metronidazole 2 g po x 1.  He recommended that she follow-up with an OB in 10 to

 14 days.








21 06:15


Test results and my conversation with Dr. Crews discussed with the patient and 

her boyfriend.  Since we are not sure if any pharmacies are open today, I will 

submit an [employer] prescription for 30 tablets of metronidazole 500 mg to the 

patient.  She will take a total of 14 of them - 1 tab BID x 7 days, and he will 

take 4 of them (2 g) all at once, leaving 2 remaining tablets that should be 

thrown in the trash.














Departure





- Departure


Time of Disposition: 06:19


Disposition: Home, Self-Care 01


Condition: Good


Clinical Impression: 


 Trichomonas vaginalis infection, First trimester pregnancy








- Discharge Information


*PRESCRIPTION DRUG MONITORING PROGRAM REVIEWED*: Not Applicable


*COPY OF PRESCRIPTION DRUG MONITORING REPORT IN PATIENT [facility]: Not Applicable


Instructions:  Trichomoniasis


Referrals: 


Francine Szymanski MD [Primary Care Provider] - 


Angy Szymanski NP [Ordering Only Provider] - 


Forms:  ED Department Discharge


Additional Instructions: 


You were seen in the emergency room for spotting and cramping in the setting of 

early pregnancy.  You have also experienced vaginal discomfort and a discharge 

for the past 2 weeks.





Work-up in the ER included several blood tests, a urinalysis, a vaginal 

GC/chlamydia by PCR test, a vaginal wet prep, and a transvaginal ultrasound.





Your work-up found that you have trichomonas vaginalis.  Your ultrasound found 

an early pregnancy, likely around 5 weeks, with a quantitative hCG (pregnancy 

hormone) level of 995.





A prescription for the antibiotic metronidazole (Flagyl) has been provided to 

you via [facility].  Take 1 tablet of metronidazole every 12 hours, starting this

morning, Monday, 2021, for 7 days, as prescribed.  Finish the entire 

prescription unless told otherwise by a doctor.





The prescription contains 30 tablets.  Your boyfriend is to take 4 tablets (2 g)

once, now.





The remaining 2 tablets should be thrown into the trash.  Do not flush them down

the toilet.





We recommend that you follow-up with your obstetrician, Dr. Francine Szymanski, in 

the next 10 to 14 days.





If any other problems, please do not hesitate to return to the ER.





Sepsis Event Note (ED)





- Focused Exam


Vital Signs: 


                                   Vital Signs











  Temp Pulse Resp BP Pulse Ox


 


 21 02:16  36.9 C  81  16  123/82  100














- My Orders


Last 24 Hours: 


My Active Orders





21 03:06


OB Transvaginal [US] Stat 





21 03:25


CULTURE URINE [MREF] Stat 





21 04:19


PATIENT RETYPE [BBK] Routine 














- Assessment/Plan


Last 24 Hours: 


My Active Orders





21 03:06


OB Transvaginal [US] Stat 





21 03:25


CULTURE URINE [MREF] Stat 





21 04:19


PATIENT RETYPE [BBK] Routine

## 2022-10-17 NOTE — EDM.PDOC
ED HPI GENERAL MEDICAL PROBLEM





- General


Chief Complaint: Gastrointestinal Problem


Stated Complaint: VOMITING


Time Seen by Provider: 01/26/21 21:15


Source of Information: Reports: Patient


History Limitations: Reports: No Limitations





- History of Present Illness


INITIAL COMMENTS - FREE TEXT/NARRATIVE: 





The patient presents with nausea, vomiting and lightheadedness.  The patient has

not felt well for about 3 weeks.  She was seen here a few days ago.  She was 

having chest pains and not feeling well.  She did take some ibuprofen and the 

chest pains went away.  Now she is having nausea and vomiting.  She cannot keep 

anything down.  She has no diarrhea.  She has no dysuria.  She has some chills 

but no fever.  She has no chest pain, shortness of breath of cough.  She does 

not think she is pregnant.  She denies abdominal pain.  She does get lightheaded

with standing and she did pass out when she got up earlier.  She still has her 

gallbladder and appendix.


Onset: Gradual


Duration: Hour(s):


Improves with: Reports: None


Worsens with: Reports: None


Associated Symptoms: Reports: Fever/Chills, Nausea/Vomiting.  Denies: Chest 

Pain, Cough, Headaches, Shortness of Breath





- Related Data


                                    Allergies











Allergy/AdvReac Type Severity Reaction Status Date / Time


 


grass pollen Allergy  Cannot Verified 01/26/21 21:06





   Remember  


 


promethazine Allergy  Rash Verified 01/26/21 21:06


 


cow milk Allergy  Hives Uncoded 01/26/21 21:06


 


essential oils Allergy  Anaphylactic Uncoded 01/26/21 21:06





   Shock  


 


tylenol with codeine Allergy  Vomiting Uncoded 01/26/21 21:06











Home Meds: 


                                    Home Meds





Albuterol [Proventil Neb Soln] 2.5 mg NEB ASDIRECTED 04/21/19 [History]


Epi-Pen. 1 unit IM ONETIME PRN 04/21/19 [History]


Desvenlafaxine Succinate [Pristiq] 50 mg PO DAILY 12/20/20 [History]


QUEtiapine [SEROquel] 50 mg PO DAILY 12/20/20 [History]











Past Medical History


HEENT History: Reports: Impaired Vision


Cardiovascular History: Reports: Other (See Below)


Other Cardiovascular History: R AV heart block


Respiratory History: Reports: Asthma, Sleep Apnea


Gastrointestinal History: Reports: GERD, Irritable Bowel Syndrome


OB/GYN History: Reports: Endometriosis


Neurological History: Reports: Headaches, Chronic, Migraines


Psychiatric History: Reports: ADHD, Anxiety, Depression, PTSD


Endocrine/Metabolic History: Reports: Hypothyroidism, Obesity/BMI 30+, Other 

(See Below)


Other Endocrine/Metabolic History: insulin resistent





- Infectious Disease History


Infectious Disease History: Reports: Chicken Pox





- Past Surgical History


HEENT Surgical History: Reports: Adenoidectomy, Oral Surgery, Tonsillectomy


GI Surgical History: Reports: Colonoscopy, EGD


Female  Surgical History: Reports: Other (See Below)


Musculoskeletal Surgical History: Reports: Other (See Below)


Other Musculoskeletal Surgeries/Procedures:: R knee sx





Social & Family History





- Family History


Family Medical History: No Pertinent Family History





- Tobacco Use


Tobacco Use Status *Q: Never Tobacco User





- Caffeine Use


Caffeine Use: Reports: Coffee, Energy Drinks, Soda, Tea





- Recreational Drug Use


Recreational Drug Use: No





- Living Situation & Occupation


Living situation: Reports: , with Spouse


Occupation: Employed (Currently employed at [employer])





ED ROS GENERAL





- Review of Systems


Review Of Systems: See Below


Constitutional: Reports: Chills.  Denies: Fever


HEENT: Reports: No Symptoms


Respiratory: Reports: No Symptoms


Cardiovascular: Reports: No Symptoms, Lightheadedness


Endocrine: Reports: No Symptoms


GI/Abdominal: Reports: Nausea, Vomiting.  Denies: Abdominal Pain, Diarrhea


: Reports: No Symptoms


Musculoskeletal: Reports: No Symptoms


Skin: Reports: No Symptoms





ED EXAM, GI/ABD





- Physical Exam


Exam: See Below


Exam Limited By: No Limitations


General Appearance: Alert, No Apparent Distress


Ears: Normal External Exam


Nose: Normal Inspection


Head: Atraumatic, Normocephalic


Neck: Normal Inspection


Respiratory/Chest: No Respiratory Distress, Lungs Clear, Normal Breath Sounds


Cardiovascular: Regular Rate, Rhythm, No Edema, No Murmur


GI/Abdominal Exam: Soft, Non-Tender, No Organomegaly, No Mass


Back Exam: Normal Inspection


Extremities: Normal Inspection





Course





- Vital Signs


Last Recorded V/S: 


                                Last Vital Signs











Temp  96.9 F   01/26/21 21:00


 


Pulse  82   01/26/21 21:00


 


Resp  16   01/26/21 21:00


 


BP  130/87   01/26/21 21:00


 


Pulse Ox  95   01/26/21 21:00














- Orders/Labs/Meds


Orders: 


                               Active Orders 24 hr











 Category Date Time Status


 


 Peripheral IV Care [RC] .AS DIRECTED Care  01/26/21 21:22 Active


 


 Sodium Chloride 0.9% [Saline Flush] Med  01/26/21 21:22 Active





 10 ml FLUSH ASDIRECTED PRN   


 


 ED Antiemetic Medication Reflex [OM.PC] Stat Oth  01/26/21 21:22 Ordered


 


 Peripheral IV Insertion Adult [OM.PC] Stat Oth  01/26/21 21:22 Ordered








                                Medication Orders





Sodium Chloride (Saline Flush)  10 ml FLUSH ASDIRECTED PRN


   PRN Reason: Keep Vein Open


   Last Admin: 01/26/21 21:45  Dose: 10 ml


   Documented by: CHYNA








Labs: 


                                Laboratory Tests











  01/26/21 01/26/21 01/26/21 Range/Units





  21:19 21:19 21:19 


 


WBC  11.69 H    (3.98-10.04)  K/mm3


 


RBC  4.76    (3.98-5.22)  M/mm3


 


Hgb  14.5    (11.2-15.7)  gm/dl


 


Hct  43.0    (34.1-44.9)  %


 


MCV  90.3    (79.4-94.8)  fl


 


MCH  30.5    (25.6-32.2)  pg


 


MCHC  33.7    (32.2-35.5)  g/dl


 


RDW Std Deviation  39.8    (36.4-46.3)  fL


 


Plt Count  262    (182-369)  K/mm3


 


MPV  9.5    (9.4-12.3)  fl


 


Neut % (Auto)  82.2 H    (34.0-71.1)  %


 


Lymph % (Auto)  12.6 L    (19.3-51.7)  %


 


Mono % (Auto)  4.4 L    (4.7-12.5)  %


 


Eos % (Auto)  0.3 L    (0.7-5.8)  


 


Baso % (Auto)  0.2    (0.1-1.2)  %


 


Neut # (Auto)  9.61 H    (1.56-6.13)  K/mm3


 


Lymph # (Auto)  1.47    (1.18-3.74)  K/mm3


 


Mono # (Auto)  0.52 H    (0.24-0.36)  K/mm3


 


Eos # (Auto)  0.04    (0.04-0.36)  K/mm3


 


Baso # (Auto)  0.02    (0.01-0.08)  K/mm3


 


Manual Slide Review  Normal smear    


 


Sodium   143   (136-145)  mEq/L


 


Potassium   3.9   (3.5-5.1)  mEq/L


 


Chloride   105   ()  mEq/L


 


Carbon Dioxide   27   (21-32)  mEq/L


 


Anion Gap   14.9   (5-15)  


 


BUN   14   (7-18)  mg/dL


 


Creatinine   0.8   (0.55-1.02)  mg/dL


 


Est Cr Clr Drug Dosing   77.59   mL/min


 


Estimated GFR (MDRD)   > 60   (>60)  mL/min


 


BUN/Creatinine Ratio   17.5   (14-18)  


 


Glucose   102   ()  mg/dL


 


Calcium   8.8   (8.5-10.1)  mg/dL


 


Total Bilirubin   0.6   (0.2-1.0)  mg/dL


 


AST   18   (15-37)  U/L


 


ALT   30   (14-59)  U/L


 


Alkaline Phosphatase   56   ()  U/L


 


Total Protein   7.3   (6.4-8.2)  g/dl


 


Albumin   3.4   (3.4-5.0)  g/dl


 


Globulin   3.9   gm/dL


 


Albumin/Globulin Ratio   0.9 L   (1-2)  


 


Lipase   61 L   ()  U/L


 


HCG, Qual    Negative  (NEGATIVE)  


 


Urine Color     (Yellow)  


 


Urine Appearance     (Clear)  


 


Urine pH     (5.0-8.0)  


 


Ur Specific Gravity     (1.005-1.030)  


 


Urine Protein     (Negative)  


 


Urine Glucose (UA)     (Negative)  


 


Urine Ketones     (Negative)  


 


Urine Occult Blood     (Negative)  


 


Urine Nitrite     (Negative)  


 


Urine Bilirubin     (Negative)  


 


Urine Urobilinogen     (0.2-1.0)  


 


Ur Leukocyte Esterase     (Negative)  


 


Urine RBC     (0-5)  /hpf


 


Urine WBC     (0-5)  /hpf


 


Ur Squamous Epith Cells     (0-5)  /hpf


 


Urine Bacteria     (FEW)  /hpf


 


Urine Mucus     (FEW)  /hpf














  01/26/21 Range/Units





  21:47 


 


WBC   (3.98-10.04)  K/mm3


 


RBC   (3.98-5.22)  M/mm3


 


Hgb   (11.2-15.7)  gm/dl


 


Hct   (34.1-44.9)  %


 


MCV   (79.4-94.8)  fl


 


MCH   (25.6-32.2)  pg


 


MCHC   (32.2-35.5)  g/dl


 


RDW Std Deviation   (36.4-46.3)  fL


 


Plt Count   (182-369)  K/mm3


 


MPV   (9.4-12.3)  fl


 


Neut % (Auto)   (34.0-71.1)  %


 


Lymph % (Auto)   (19.3-51.7)  %


 


Mono % (Auto)   (4.7-12.5)  %


 


Eos % (Auto)   (0.7-5.8)  


 


Baso % (Auto)   (0.1-1.2)  %


 


Neut # (Auto)   (1.56-6.13)  K/mm3


 


Lymph # (Auto)   (1.18-3.74)  K/mm3


 


Mono # (Auto)   (0.24-0.36)  K/mm3


 


Eos # (Auto)   (0.04-0.36)  K/mm3


 


Baso # (Auto)   (0.01-0.08)  K/mm3


 


Manual Slide Review   


 


Sodium   (136-145)  mEq/L


 


Potassium   (3.5-5.1)  mEq/L


 


Chloride   ()  mEq/L


 


Carbon Dioxide   (21-32)  mEq/L


 


Anion Gap   (5-15)  


 


BUN   (7-18)  mg/dL


 


Creatinine   (0.55-1.02)  mg/dL


 


Est Cr Clr Drug Dosing   mL/min


 


Estimated GFR (MDRD)   (>60)  mL/min


 


BUN/Creatinine Ratio   (14-18)  


 


Glucose   ()  mg/dL


 


Calcium   (8.5-10.1)  mg/dL


 


Total Bilirubin   (0.2-1.0)  mg/dL


 


AST   (15-37)  U/L


 


ALT   (14-59)  U/L


 


Alkaline Phosphatase   ()  U/L


 


Total Protein   (6.4-8.2)  g/dl


 


Albumin   (3.4-5.0)  g/dl


 


Globulin   gm/dL


 


Albumin/Globulin Ratio   (1-2)  


 


Lipase   ()  U/L


 


HCG, Qual   (NEGATIVE)  


 


Urine Color  Yellow  (Yellow)  


 


Urine Appearance  Clear  (Clear)  


 


Urine pH  6.0  (5.0-8.0)  


 


Ur Specific Gravity  > or = 1.030  (1.005-1.030)  


 


Urine Protein  Trace H  (Negative)  


 


Urine Glucose (UA)  Negative  (Negative)  


 


Urine Ketones  Negative  (Negative)  


 


Urine Occult Blood  2+ H  (Negative)  


 


Urine Nitrite  Negative  (Negative)  


 


Urine Bilirubin  Negative  (Negative)  


 


Urine Urobilinogen  0.2  (0.2-1.0)  


 


Ur Leukocyte Esterase  Trace H  (Negative)  


 


Urine RBC  0-5  (0-5)  /hpf


 


Urine WBC  0-5  (0-5)  /hpf


 


Ur Squamous Epith Cells  0-5  (0-5)  /hpf


 


Urine Bacteria  Few  (FEW)  /hpf


 


Urine Mucus  Few  (FEW)  /hpf











Meds: 


Medications











Generic Name Dose Route Start Last Admin





  Trade Name Freadonis  PRN Reason Stop Dose Admin


 


Sodium Chloride  10 ml  01/26/21 21:22  01/26/21 21:45





  Saline Flush  FLUSH   10 ml





  ASDIRECTED PRN   Administration





  Keep Vein Open  














Discontinued Medications














Generic Name Dose Route Start Last Admin





  Trade Name Eliu  PRN Reason Stop Dose Admin


 


Diphenhydramine HCl  50 mg  01/26/21 22:27  01/26/21 22:34





  Benadryl  IVPUSH  01/26/21 22:28  50 mg





  ONETIME ONE   Administration


 


Sodium Chloride  1,000 mls @ 1,000 mls/hr  01/26/21 21:22  01/26/21 21:30





  Normal Saline  IV  01/26/21 22:21  1,000 mls/hr





  .BOLUS STA   Administration


 


Lactated Ringer's  1,000 mls @ 1,000 mls/hr  01/26/21 22:26  01/26/21 22:38





  Ringers, Lactated  IV  01/26/21 23:25  1,000 mls/hr





  .BOLUS ONE   Administration


 


Metoclopramide HCl  10 mg  01/26/21 22:27  01/26/21 22:32





  Reglan  IVPUSH  01/26/21 22:28  10 mg





  ONETIME ONE   Administration


 


Ondansetron HCl  4 mg  01/26/21 21:22  01/26/21 21:30





  Zofran  IVPUSH  01/26/21 21:23  4 mg





  ONETIME ONE   Administration














- Re-Assessments/Exams


Free Text/Narrative Re-Assessment/Exam: 





01/26/21 21:29


I ordered an IV NS 1L bolus, zofran 4mg IV, labs and UA.


01/26/21 23:14


Her WBC was slightly elevated at 11.69.  Her CMP looks good.  Her lipase is low 

at 61.  Her HCG is negative.  Her UA shows no UTI.  She still had nausea so I 

ordered another bolus and I ordered 1L of LR, reglan 10mg IV and benadryl 50mg 

IV.


01/26/21 23:35


She feels better now and she is actually sleeping.  I will discharge her with 

some zofran.





Departure





- Departure


Time of Disposition: 23:35


Disposition: Home, Self-Care 01


Condition: Good


Clinical Impression: 


 Lightheaded





Nausea & vomiting


Qualifiers:


 Vomiting type: unspecified Vomiting Intractability: non-intractable Qualified 

Code(s): R11.2 - Nausea with vomiting, unspecified








- Discharge Information


*PRESCRIPTION DRUG MONITORING PROGRAM REVIEWED*: Not Applicable


*COPY OF PRESCRIPTION DRUG MONITORING REPORT IN PATIENT [facility]: Not Applicable


Referrals: 


Angy Szymanski NP [Primary Care Provider] - 1 Week


Forms:  ED Department Discharge


Additional Instructions: 


Drink plenty of fluids.  Take the zofran every 6 hours as needed.  Advance your 

diet as tolerated.  Follow up with Alysha Szymanski within a week.  Please return 

if you are worse.





Sepsis Event Note (ED)





- Evaluation


Sepsis Screening Result: No Definite Risk





- Focused Exam


Vital Signs: 


                                   Vital Signs











  Temp Pulse Resp BP Pulse Ox


 


 01/26/21 21:00  96.9 F  82  16  130/87  95














- My Orders


Last 24 Hours: 


My Active Orders





01/26/21 21:22


Peripheral IV Care [RC] .AS DIRECTED 


Sodium Chloride 0.9% [Saline Flush]   10 ml FLUSH ASDIRECTED PRN 


ED Antiemetic Medication Reflex [OM.PC] Stat 


Peripheral IV Insertion Adult [OM.PC] Stat 














- Assessment/Plan


Last 24 Hours: 


My Active Orders





01/26/21 21:22


Peripheral IV Care [RC] .AS DIRECTED 


Sodium Chloride 0.9% [Saline Flush]   10 ml FLUSH ASDIRECTED PRN 


ED Antiemetic Medication Reflex [OM.PC] Stat 


Peripheral IV Insertion Adult [OM.PC] Stat Notified at time of office visit

## 2023-02-18 ENCOUNTER — HOSPITAL ENCOUNTER (EMERGENCY)
Dept: HOSPITAL 41 - JD.ED | Age: 33
Discharge: HOME | End: 2023-02-18
Payer: MEDICAID

## 2023-02-18 DIAGNOSIS — Z91.011: ICD-10-CM

## 2023-02-18 DIAGNOSIS — Z88.5: ICD-10-CM

## 2023-02-18 DIAGNOSIS — Z88.8: ICD-10-CM

## 2023-02-18 DIAGNOSIS — Z91.040: ICD-10-CM

## 2023-02-18 DIAGNOSIS — Z91.048: ICD-10-CM

## 2023-02-18 DIAGNOSIS — E66.9: ICD-10-CM

## 2023-02-18 DIAGNOSIS — J45.909: ICD-10-CM

## 2023-02-18 DIAGNOSIS — U07.1: Primary | ICD-10-CM

## 2023-02-18 LAB — SARS-COV-2 RNA RESP QL NAA+PROBE: POSITIVE

## 2023-02-18 PROCEDURE — 99284 EMERGENCY DEPT VISIT MOD MDM: CPT

## 2023-02-18 PROCEDURE — 0241U: CPT

## 2023-02-18 PROCEDURE — 71045 X-RAY EXAM CHEST 1 VIEW: CPT

## 2023-04-23 ENCOUNTER — HOSPITAL ENCOUNTER (EMERGENCY)
Dept: HOSPITAL 41 - JD.ED | Age: 33
Discharge: HOME | End: 2023-04-23
Payer: MEDICAID

## 2023-04-23 DIAGNOSIS — Z79.899: ICD-10-CM

## 2023-04-23 DIAGNOSIS — G43.909: Primary | ICD-10-CM

## 2023-04-23 DIAGNOSIS — Z87.891: ICD-10-CM

## 2023-04-23 DIAGNOSIS — Z91.048: ICD-10-CM

## 2023-04-23 DIAGNOSIS — Z88.8: ICD-10-CM

## 2023-04-23 DIAGNOSIS — Z20.822: ICD-10-CM

## 2023-04-23 DIAGNOSIS — Z91.011: ICD-10-CM

## 2023-04-23 DIAGNOSIS — Z88.5: ICD-10-CM

## 2023-04-23 DIAGNOSIS — J45.909: ICD-10-CM

## 2023-04-23 DIAGNOSIS — Z91.018: ICD-10-CM

## 2023-04-23 DIAGNOSIS — E66.9: ICD-10-CM

## 2023-04-23 DIAGNOSIS — Z91.040: ICD-10-CM

## 2023-04-23 LAB
EGFRCR SERPLBLD CKD-EPI 2021: 87 ML/MIN (ref 60–?)
SARS-COV-2 RNA RESP QL NAA+PROBE: NEGATIVE

## 2023-04-23 PROCEDURE — 96372 THER/PROPH/DIAG INJ SC/IM: CPT

## 2023-04-23 PROCEDURE — 0240U: CPT

## 2023-04-23 PROCEDURE — 80053 COMPREHEN METABOLIC PANEL: CPT

## 2023-04-23 PROCEDURE — 85025 COMPLETE CBC W/AUTO DIFF WBC: CPT

## 2023-04-23 PROCEDURE — 99284 EMERGENCY DEPT VISIT MOD MDM: CPT

## 2023-04-23 PROCEDURE — 70450 CT HEAD/BRAIN W/O DYE: CPT

## 2023-04-23 PROCEDURE — 36415 COLL VENOUS BLD VENIPUNCTURE: CPT

## 2023-04-23 RX ADMIN — KETOROLAC TROMETHAMINE ONE MG: 30 INJECTION, SOLUTION INTRAMUSCULAR at 12:21

## 2023-04-23 RX ADMIN — DIPHENHYDRAMINE HYDROCHLORIDE ONE MG: 50 INJECTION, SOLUTION INTRAMUSCULAR; INTRAVENOUS at 12:20

## 2023-04-23 RX ADMIN — KETOROLAC TROMETHAMINE ONE: 30 INJECTION, SOLUTION INTRAMUSCULAR at 12:42

## 2023-04-23 RX ADMIN — DIPHENHYDRAMINE HYDROCHLORIDE ONE: 50 INJECTION, SOLUTION INTRAMUSCULAR; INTRAVENOUS at 12:42
